# Patient Record
Sex: MALE | Race: WHITE | NOT HISPANIC OR LATINO | Employment: OTHER | URBAN - METROPOLITAN AREA
[De-identification: names, ages, dates, MRNs, and addresses within clinical notes are randomized per-mention and may not be internally consistent; named-entity substitution may affect disease eponyms.]

---

## 2019-06-28 ENCOUNTER — APPOINTMENT (OUTPATIENT)
Dept: RADIOLOGY | Facility: MEDICAL CENTER | Age: 59
End: 2019-06-28
Attending: EMERGENCY MEDICINE
Payer: COMMERCIAL

## 2019-06-28 ENCOUNTER — HOSPITAL ENCOUNTER (EMERGENCY)
Facility: MEDICAL CENTER | Age: 59
End: 2019-06-28
Attending: EMERGENCY MEDICINE
Payer: COMMERCIAL

## 2019-06-28 VITALS
TEMPERATURE: 98.4 F | WEIGHT: 242.29 LBS | RESPIRATION RATE: 16 BRPM | BODY MASS INDEX: 35.89 KG/M2 | HEIGHT: 69 IN | OXYGEN SATURATION: 95 % | SYSTOLIC BLOOD PRESSURE: 154 MMHG | HEART RATE: 66 BPM | DIASTOLIC BLOOD PRESSURE: 97 MMHG

## 2019-06-28 DIAGNOSIS — I82.4Z2 ACUTE DEEP VEIN THROMBOSIS (DVT) OF DISTAL VEIN OF LEFT LOWER EXTREMITY (HCC): ICD-10-CM

## 2019-06-28 LAB
ALBUMIN SERPL BCP-MCNC: 4 G/DL (ref 3.2–4.9)
ALBUMIN/GLOB SERPL: 1.3 G/DL
ALP SERPL-CCNC: 56 U/L (ref 30–99)
ALT SERPL-CCNC: 36 U/L (ref 2–50)
ANION GAP SERPL CALC-SCNC: 10 MMOL/L (ref 0–11.9)
APTT PPP: 26.9 SEC (ref 24.7–36)
AST SERPL-CCNC: 25 U/L (ref 12–45)
BASOPHILS # BLD AUTO: 0.2 % (ref 0–1.8)
BASOPHILS # BLD: 0.02 K/UL (ref 0–0.12)
BILIRUB SERPL-MCNC: 2.2 MG/DL (ref 0.1–1.5)
BUN SERPL-MCNC: 17 MG/DL (ref 8–22)
CALCIUM SERPL-MCNC: 8.9 MG/DL (ref 8.4–10.2)
CHLORIDE SERPL-SCNC: 98 MMOL/L (ref 96–112)
CO2 SERPL-SCNC: 29 MMOL/L (ref 20–33)
CREAT SERPL-MCNC: 0.99 MG/DL (ref 0.5–1.4)
EOSINOPHIL # BLD AUTO: 0.11 K/UL (ref 0–0.51)
EOSINOPHIL NFR BLD: 1.1 % (ref 0–6.9)
ERYTHROCYTE [DISTWIDTH] IN BLOOD BY AUTOMATED COUNT: 40 FL (ref 35.9–50)
GLOBULIN SER CALC-MCNC: 3.2 G/DL (ref 1.9–3.5)
GLUCOSE SERPL-MCNC: 103 MG/DL (ref 65–99)
HCT VFR BLD AUTO: 49.2 % (ref 42–52)
HGB BLD-MCNC: 16.8 G/DL (ref 14–18)
IMM GRANULOCYTES # BLD AUTO: 0.02 K/UL (ref 0–0.11)
IMM GRANULOCYTES NFR BLD AUTO: 0.2 % (ref 0–0.9)
INR PPP: 0.96 (ref 0.87–1.13)
LYMPHOCYTES # BLD AUTO: 2.37 K/UL (ref 1–4.8)
LYMPHOCYTES NFR BLD: 24.3 % (ref 22–41)
MCH RBC QN AUTO: 29.6 PG (ref 27–33)
MCHC RBC AUTO-ENTMCNC: 34.1 G/DL (ref 33.7–35.3)
MCV RBC AUTO: 86.8 FL (ref 81.4–97.8)
MONOCYTES # BLD AUTO: 0.48 K/UL (ref 0–0.85)
MONOCYTES NFR BLD AUTO: 4.9 % (ref 0–13.4)
NEUTROPHILS # BLD AUTO: 6.74 K/UL (ref 1.82–7.42)
NEUTROPHILS NFR BLD: 69.3 % (ref 44–72)
NRBC # BLD AUTO: 0 K/UL
NRBC BLD-RTO: 0 /100 WBC
PLATELET # BLD AUTO: 211 K/UL (ref 164–446)
PMV BLD AUTO: 10.2 FL (ref 9–12.9)
POTASSIUM SERPL-SCNC: 3.3 MMOL/L (ref 3.6–5.5)
PROT SERPL-MCNC: 7.2 G/DL (ref 6–8.2)
PROTHROMBIN TIME: 12.9 SEC (ref 12–14.6)
RBC # BLD AUTO: 5.67 M/UL (ref 4.7–6.1)
SODIUM SERPL-SCNC: 137 MMOL/L (ref 135–145)
WBC # BLD AUTO: 9.7 K/UL (ref 4.8–10.8)

## 2019-06-28 PROCEDURE — 93971 EXTREMITY STUDY: CPT | Mod: LT

## 2019-06-28 PROCEDURE — 85025 COMPLETE CBC W/AUTO DIFF WBC: CPT

## 2019-06-28 PROCEDURE — 700102 HCHG RX REV CODE 250 W/ 637 OVERRIDE(OP): Performed by: EMERGENCY MEDICINE

## 2019-06-28 PROCEDURE — A9270 NON-COVERED ITEM OR SERVICE: HCPCS | Performed by: EMERGENCY MEDICINE

## 2019-06-28 PROCEDURE — 99284 EMERGENCY DEPT VISIT MOD MDM: CPT

## 2019-06-28 PROCEDURE — 80053 COMPREHEN METABOLIC PANEL: CPT

## 2019-06-28 PROCEDURE — 85610 PROTHROMBIN TIME: CPT

## 2019-06-28 PROCEDURE — 85730 THROMBOPLASTIN TIME PARTIAL: CPT

## 2019-06-28 RX ADMIN — RIVAROXABAN 15 MG: 15 TABLET, FILM COATED ORAL at 17:50

## 2019-06-28 NOTE — ED PROVIDER NOTES
ED Provider Note    CHIEF COMPLAINT   Chief Complaint   Patient presents with   • Leg Swelling   • Leg Pain       HPI   Enmanuel Zhou is a 59 y.o. male who presents to the ED with left leg pain.  The patient states that he has been having left leg pain for the last 10 days.  He has a history of factor V Leiden, is on Plavix, he states that his last DVT was approximately 10 to 12 years ago.  He states that after he started complaining of leg pain he flew to [place], he states the pain got slightly improved but then got worse.  He notices a focal spot on the medial aspect of the left leg and then this morning starting noticing some breath, no history of cellulitis.  No abdominal pains, nausea vomiting.    REVIEW OF SYSTEMS   See HPI for further details. All other systems are negative.     PAST MEDICAL HISTORY   Past Medical History:   Diagnosis Date   • DVT (deep vein thrombosis) in pregnancy (HCC)    • Factor 5 Leiden mutation, heterozygous (HCC)        FAMILY HISTORY  History reviewed. No pertinent family history.    SOCIAL HISTORY  Social History     Social History   • Marital status: Single     Spouse name: N/A   • Number of children: N/A   • Years of education: N/A     Social History Main Topics   • Smoking status: Never Smoker   • Smokeless tobacco: Never Used   • Alcohol use Yes      Comment: glass of wine a day   • Drug use: No   • Sexual activity: Not on file     Other Topics Concern   • Not on file     Social History Narrative   • No narrative on file       SURGICAL HISTORY  Past Surgical History:   Procedure Laterality Date   • MADELIN BY LAPAROSCOPY  11/12/08    Performed by YASMANI KERN at SURGERY SAME DAY [facility] ORS   • OTHER ABDOMINAL SURGERY         CURRENT MEDICATIONS   Home Medications    **Home medications have not yet been reviewed for this encounter**         ALLERGIES   No Known Allergies    PHYSICAL EXAM  VITAL SIGNS: /97   Pulse 66   Temp 36.9 °C (98.4 °F) (Temporal)    "Resp 16   Ht 1.753 m (5' 9\")   Wt 109.9 kg (242 lb 4.6 oz)   SpO2 95%   BMI 35.78 kg/m²   Constitutional: Well developed, Well nourished, mild distress, Non-toxic appearance.   HENT:  Atraumatic, Normocephalic, Oral pharynx with moist mucous membranes.   Eyes: EOMI, PERRL  Cardiovascular: Good Pulses  Thorax & Lungs: No respiratory distress  Abdomen: Soft nontender   Skin: Warm, Dry, No erythema, No rash.   Musculoskeletal: Patient with soft tissue swelling and diffuse edema throughout the left lower leg, the patient does have some streaky erythema on the medial aspect of the left distal tib-fib area that was outlined with a pen.  Patient has 2+ pulses, slight tenderness palpation of the calf.  Neurologic: Alert & oriented x 3,     RADIOLOGY/PROCEDURES  US-EXTREMITY VENOUS LOWER UNILAT LEFT   Final Result        Results for orders placed or performed during the hospital encounter of 06/28/19   CBC WITH DIFFERENTIAL   Result Value Ref Range    WBC 9.7 4.8 - 10.8 K/uL    RBC 5.67 4.70 - 6.10 M/uL    Hemoglobin 16.8 14.0 - 18.0 g/dL    Hematocrit 49.2 42.0 - 52.0 %    MCV 86.8 81.4 - 97.8 fL    MCH 29.6 27.0 - 33.0 pg    MCHC 34.1 33.7 - 35.3 g/dL    RDW 40.0 35.9 - 50.0 fL    Platelet Count 211 164 - 446 K/uL    MPV 10.2 9.0 - 12.9 fL    Neutrophils-Polys 69.30 44.00 - 72.00 %    Lymphocytes 24.30 22.00 - 41.00 %    Monocytes 4.90 0.00 - 13.40 %    Eosinophils 1.10 0.00 - 6.90 %    Basophils 0.20 0.00 - 1.80 %    Immature Granulocytes 0.20 0.00 - 0.90 %    Nucleated RBC 0.00 /100 WBC    Neutrophils (Absolute) 6.74 1.82 - 7.42 K/uL    Lymphs (Absolute) 2.37 1.00 - 4.80 K/uL    Monos (Absolute) 0.48 0.00 - 0.85 K/uL    Eos (Absolute) 0.11 0.00 - 0.51 K/uL    Baso (Absolute) 0.02 0.00 - 0.12 K/uL    Immature Granulocytes (abs) 0.02 0.00 - 0.11 K/uL    NRBC (Absolute) 0.00 K/uL   COMP METABOLIC PANEL   Result Value Ref Range    Sodium 137 135 - 145 mmol/L    Potassium 3.3 (L) 3.6 - 5.5 mmol/L    Chloride 98 96 - 112 " mmol/L    Co2 29 20 - 33 mmol/L    Anion Gap 10.0 0.0 - 11.9    Glucose 103 (H) 65 - 99 mg/dL    Bun 17 8 - 22 mg/dL    Creatinine 0.99 0.50 - 1.40 mg/dL    Calcium 8.9 8.4 - 10.2 mg/dL    AST(SGOT) 25 12 - 45 U/L    ALT(SGPT) 36 2 - 50 U/L    Alkaline Phosphatase 56 30 - 99 U/L    Total Bilirubin 2.2 (H) 0.1 - 1.5 mg/dL    Albumin 4.0 3.2 - 4.9 g/dL    Total Protein 7.2 6.0 - 8.2 g/dL    Globulin 3.2 1.9 - 3.5 g/dL    A-G Ratio 1.3 g/dL   PROTHROMBIN TIME   Result Value Ref Range    PT 12.9 12.0 - 14.6 sec    INR 0.96 0.87 - 1.13   APTT   Result Value Ref Range    APTT 26.9 24.7 - 36.0 sec   ESTIMATED GFR   Result Value Ref Range    GFR If African American >60 >60 mL/min/1.73 m 2    GFR If Non African American >60 >60 mL/min/1.73 m 2        COURSE & MEDICAL DECISION MAKING  Pertinent Labs & Imaging studies reviewed. (See chart for details)  Patient with concerns about a DVT versus cellulitis, we will obtain ultrasound, this is negative we will put the patient on antibiotics.    Due to the patient's factor V Leiden, the gastrocnemius vein thrombosis, elected to treat the patient with Xarelto, will have the patient follow-up with the anticoagulation clinic, have the patient return with any other concerns.    FINAL IMPRESSION  1. Acute deep vein thrombosis (DVT) of distal vein of left lower extremity (HCC)        Patient referred to primary care provider for blood pressure management     This dictation was created using voice recognition software. The accuracy of the dictation is limited to the abilities of the software. I expect there may be some errors of grammar and possibly content. The nursing notes were reviewed and certain aspects of this information were incorporated into this note.    Electronically signed by: Asif Eason, 6/28/2019 3:06 PM

## 2019-06-28 NOTE — ED TRIAGE NOTES
Pt c/o LLE calf pain that started 2 weeks ago. Pt thought he pulled a muscle. Pt flew to [place] last week and flew back yesterday. Pt is on plavix, has factor 5 deficiency. Has had DVT's in the past. States the pain has gotten worse and the leg is swollen. Pt saw PCP and they tried to order an US but was unable to get it done. Denies any SOB or CP

## 2019-06-29 NOTE — ED NOTES
.Pt D/C to home. VSS. D/C instructions and 1 prescriptions and xarelto coupon pack given to patient. Pt to take the xarelto in addition to his plavix per erp orders. Pt to f/u with coagulation clinic on Monday morning for further evaluation. Pt verbalizes understanding. Pt leaves ED with no acute changes, complaints or concerns. Pt ambulated out with a steady gait and all belongings.

## 2020-03-13 ENCOUNTER — OFFICE VISIT (OUTPATIENT)
Dept: MEDICAL GROUP | Age: 60
End: 2020-03-13
Payer: COMMERCIAL

## 2020-03-13 VITALS
HEIGHT: 69 IN | HEART RATE: 80 BPM | TEMPERATURE: 97.7 F | WEIGHT: 247 LBS | OXYGEN SATURATION: 95 % | BODY MASS INDEX: 36.58 KG/M2 | DIASTOLIC BLOOD PRESSURE: 98 MMHG | SYSTOLIC BLOOD PRESSURE: 120 MMHG

## 2020-03-13 DIAGNOSIS — Z00.00 ANNUAL PHYSICAL EXAM: ICD-10-CM

## 2020-03-13 DIAGNOSIS — M25.512 ACUTE PAIN OF LEFT SHOULDER: ICD-10-CM

## 2020-03-13 DIAGNOSIS — I10 ESSENTIAL HYPERTENSION: ICD-10-CM

## 2020-03-13 DIAGNOSIS — Z00.00 ENCOUNTER FOR MEDICAL EXAMINATION TO ESTABLISH CARE: ICD-10-CM

## 2020-03-13 DIAGNOSIS — Z86.73 HISTORY OF CVA (CEREBROVASCULAR ACCIDENT): ICD-10-CM

## 2020-03-13 DIAGNOSIS — Z11.59 NEED FOR HEPATITIS C SCREENING TEST: ICD-10-CM

## 2020-03-13 DIAGNOSIS — D68.51 FACTOR 5 LEIDEN MUTATION, HETEROZYGOUS (HCC): ICD-10-CM

## 2020-03-13 DIAGNOSIS — E66.9 OBESITY (BMI 30-39.9): ICD-10-CM

## 2020-03-13 PROCEDURE — 99204 OFFICE O/P NEW MOD 45 MIN: CPT | Performed by: FAMILY MEDICINE

## 2020-03-13 ASSESSMENT — FIBROSIS 4 INDEX: FIB4 SCORE: 1.22

## 2020-03-13 NOTE — PROGRESS NOTES
This medical record contains text that has been entered with the assistance of computer voice recognition and dictation software. Therefore, it may contain unintended errors in text, spelling, punctuation or grammar.    Chief Complaint   Patient presents with   • Establish Care       HPI:   Enmanuel Zhou is a 60 y.o. male here evaluation and management of:      Encounter for medical examination to establish care  Annual physical exam    The patient presents today to establish care.     Today, the patient denied any acute complaints including recent illness, cough, chest pain, shortness of breath, abdominal pain, bowel changes, hematochezia, melena or urinary symptoms.      Past medical history--- Patient has a history of DVT and is heterozygous for factor 5 Leiden and is on Plavix. He had a stroke at age 40 in 2001.     Family History of Cancer--- Denies.    Family History of CAD--- Denies early history of cardiac disease.      Social History  Occupation---- Patient is employed as an  for [employer]. He plans to retire in August 2020.    Exercise--- Patient does not exercise regularly. His job is sedentary but states he will try to walk on his breaks on occasion.    Colonoscopy--- Completed 1-2 years ago and was normal.    Obesity (BMI 30-39.9)  Chronic history    The patient has a chronic history of obesity. He does not exercise regularly but will attempt to walk on his break on occasion. He does not follow a specific diet.    History of CVA (cerebrovascular accident)  Factor 5 Leiden mutation, heterozygous (HCC)  Chronic history    The patient experienced a cerebral vascular accident at age 40 in 2001. At that time, he was diagnosed with factor 5 Leiden and is on Plavix. Upon discontinuing Plavix and starting Aspirin, he experienced a DVT and phlebitis. He was restarted on Plavix. He is not taking Aspirin currently. Negative for acute neurological complaints or active  "bleeding.    Essential hypertension  Chronic history    Patient is taking Amlodipine dose? and denies any medication side effects. He reportedly monitors his blood pressure regularly at home. Blood pressure is 120/98 today. He reports following a low sodium diet and denies any related complaints including chronic cough, chest pain, headaches or dizziness.     Acute pain of left shoulder    The patient developed acute pain to his left shoulder following heavy lifting. Recently, he completed imaging of his shoulder but states this is pending. His pain remains constant and described as a dull ache. Pain is well controlled with over the counter medications. Negative for upper extremity weakness.     Need for hepatitis C screening test    The patient is due for hepatitis C screening. No prior history or known exposure.     Current medicines (including changes today)  No current outpatient medications on file.     No current facility-administered medications for this visit.      He  has a past medical history of DVT (deep vein thrombosis) in pregnancy and Factor 5 Leiden mutation, heterozygous (HCC).  He  has a past surgical history that includes cholo by laparoscopy (11/12/08) and other abdominal surgery.  Social History     Tobacco Use   • Smoking status: Never Smoker   • Smokeless tobacco: Never Used   Substance Use Topics   • Alcohol use: Yes     Comment: glass of wine a day   • Drug use: No     Social History     Social History Narrative   • Not on file     History reviewed. No pertinent family history.  No family status information on file.       ROS    Please see HPI for further details.     All other systems reviewed and are negative.     Objective:     /98 (BP Location: Left arm, Patient Position: Sitting, BP Cuff Size: Adult)   Pulse 80   Temp 36.5 °C (97.7 °F) (Temporal)   Ht 1.753 m (5' 9\")   Wt 112 kg (247 lb)   SpO2 95%  Body mass index is 36.48 kg/m².    Physical Exam:    Constitutional: Alert, " no distress.  Skin: Warm, dry, good turgor, no rashes in visible areas.  Eye: Equal, round and reactive, conjunctiva clear, lids normal.  ENMT: Lips without lesions, good dentition, oropharynx clear.  Neck: Trachea midline, no masses, no thyromegaly. No cervical or supraclavicular lymphadenopathy.  Respiratory: Unlabored respiratory effort, lungs clear to auscultation, no wheezes, no ronchi.  Cardiovascular: Normal S1, S2, no murmur, no edema.  Psych: Alert and oriented x3, normal affect and mood.      Assessment and Plan:   The following treatment plan was discussed:    1. Encounter for medical examination to establish care    Care has been established  We need baseline labs to establish a clinical profile      Age-appropriate preventive services recommended by USPTF guidelines and ACIP were discussed today.    The decision to initiate low-dose aspirin use for the primary prevention of CVD and CRC in adults aged 60 to 69 years who have a 10% or greater 10-year CVD risk should be an individual one    Requested any outside Medical records to be sent to us, he will bring us medication list as well  Denies intimate partner violence  Discussed seat belt safety    We discussed precautions for the coronavirus which include frequent hand washing with warm water and soap for at least 20 seconds, avoiding exposure to ill contacts and limiting exposure to heavily populated locations unless necessary.      This is a 60 year old male here today for a preventative exam.  Previous medical history, healthcare maintenance and immunization status reviewed.  Patient is up to date. Annual labwork ordered. See discussion of anticipatory guidance below. Patient will return annually for preventative exams.    - Comp Metabolic Panel; Future  - CBC WITHOUT DIFFERENTIAL; Future  - Lipid Profile; Future  - PROSTATE SPECIFIC AG DIAGNOSTIC; Future  - TSH+FREE T4  - T3 FREE; Future    2. Obesity (BMI 30-39.9)    Chronic history of obesity.  Recommended the patient exercise regularly and follow a healthy diet.    3. History of CVA (cerebrovascular accident)    Chronic in nature and stable. Patient continues taking Plavix. No acute symptoms.    4. Factor 5 Leiden mutation, heterozygous (HCC)    Chronic in nature and stable. Patient continues taking Plavix. No acute symptoms.    5. Essential hypertension    Chronic, uncontrolled  Amlodipine. No changes in dosage today. Blood pressure today was 120/98. Patient was asked to bring in a 2wk bp log with medication list    7. Acute pain of left shoulder    Acute complaint. Pending imaging results. Encouraged the patient to return to his provider for these results and treatment options.     8. Need for hepatitis C screening test    The patient was born between 1947 and 1967. He is due for hepatitis C screening. Order provided to the patient today. The USPSTF recommends offering 1-time screening for HCV infection to adults born between 1945 and 1965 (baby boomers).      HEALTH MAINTENANCE: As noted above.      -- Follow up in 6 months to one year. Instructed to follow up in clinic or ER for worsening symptoms, difficulty breathing, lack of expected recovery or should new symptoms or problems arise.      Once again this medical record contains text that has been entered with the assistance of computer voice recognition, dictation software and medical scribes. Therefore, it may contain unintended errors in text, spelling, punctuation or grammar.    Jerica DELGADO (Scribe), am scribing for, and in the presence of, Zander Manzanares M.D.    Electronically signed by: Jerica Morales (Scribe), 3/13/2020     IZander M.D. personally performed the services described in this documentation, as scribed by Jerica Morales in my presence, and it is both accurate and complete.

## 2020-04-07 ENCOUNTER — OFFICE VISIT (OUTPATIENT)
Dept: MEDICAL GROUP | Age: 60
End: 2020-04-07
Payer: COMMERCIAL

## 2020-04-07 VITALS
WEIGHT: 237 LBS | HEART RATE: 86 BPM | TEMPERATURE: 96.8 F | DIASTOLIC BLOOD PRESSURE: 79 MMHG | SYSTOLIC BLOOD PRESSURE: 125 MMHG | BODY MASS INDEX: 35.1 KG/M2 | HEIGHT: 69 IN

## 2020-04-07 DIAGNOSIS — E66.9 OBESITY (BMI 30-39.9): ICD-10-CM

## 2020-04-07 DIAGNOSIS — I10 ESSENTIAL HYPERTENSION: ICD-10-CM

## 2020-04-07 LAB
ALBUMIN SERPL-MCNC: 4.2 G/DL (ref 3.8–4.8)
ALBUMIN/GLOB SERPL: 1.6 {RATIO} (ref 1.2–2.2)
ALP SERPL-CCNC: 67 IU/L (ref 39–117)
ALT SERPL-CCNC: 25 IU/L (ref 0–44)
AST SERPL-CCNC: 21 IU/L (ref 0–40)
BILIRUB SERPL-MCNC: 1.8 MG/DL (ref 0–1.2)
BUN SERPL-MCNC: 23 MG/DL (ref 8–27)
BUN/CREAT SERPL: 19 (ref 10–24)
CALCIUM SERPL-MCNC: 9.2 MG/DL (ref 8.6–10.2)
CHLORIDE SERPL-SCNC: 103 MMOL/L (ref 96–106)
CHOLEST SERPL-MCNC: 169 MG/DL (ref 100–199)
CO2 SERPL-SCNC: 25 MMOL/L (ref 20–29)
CREAT SERPL-MCNC: 1.21 MG/DL (ref 0.76–1.27)
ERYTHROCYTE [DISTWIDTH] IN BLOOD BY AUTOMATED COUNT: 13.2 % (ref 11.6–15.4)
GLOBULIN SER CALC-MCNC: 2.7 G/DL (ref 1.5–4.5)
GLUCOSE SERPL-MCNC: 103 MG/DL (ref 65–99)
HCT VFR BLD AUTO: 50.4 % (ref 37.5–51)
HDLC SERPL-MCNC: 44 MG/DL
HGB BLD-MCNC: 17 G/DL (ref 13–17.7)
LABORATORY COMMENT REPORT: ABNORMAL
LDLC SERPL CALC-MCNC: 106 MG/DL (ref 0–99)
MCH RBC QN AUTO: 30.2 PG (ref 26.6–33)
MCHC RBC AUTO-ENTMCNC: 33.7 G/DL (ref 31.5–35.7)
MCV RBC AUTO: 90 FL (ref 79–97)
NRBC BLD AUTO-RTO: NORMAL %
PLATELET # BLD AUTO: 240 X10E3/UL (ref 150–450)
POTASSIUM SERPL-SCNC: 4.1 MMOL/L (ref 3.5–5.2)
PROT SERPL-MCNC: 6.9 G/DL (ref 6–8.5)
PSA SERPL-MCNC: 1 NG/ML (ref 0–4)
RBC # BLD AUTO: 5.63 X10E6/UL (ref 4.14–5.8)
SODIUM SERPL-SCNC: 143 MMOL/L (ref 134–144)
T3FREE SERPL-MCNC: 3.4 PG/ML (ref 2–4.4)
T4 FREE SERPL-MCNC: 1.2 NG/DL (ref 0.82–1.77)
TRIGL SERPL-MCNC: 93 MG/DL (ref 0–149)
TSH SERPL DL<=0.005 MIU/L-ACNC: 1.82 UIU/ML (ref 0.45–4.5)
VLDLC SERPL CALC-MCNC: 19 MG/DL (ref 5–40)
WBC # BLD AUTO: 6.9 X10E3/UL (ref 3.4–10.8)

## 2020-04-07 PROCEDURE — 99213 OFFICE O/P EST LOW 20 MIN: CPT | Mod: 95,CR | Performed by: FAMILY MEDICINE

## 2020-04-07 ASSESSMENT — PATIENT HEALTH QUESTIONNAIRE - PHQ9: CLINICAL INTERPRETATION OF PHQ2 SCORE: 0

## 2020-04-07 ASSESSMENT — FIBROSIS 4 INDEX: FIB4 SCORE: 1.085

## 2020-04-07 NOTE — PROGRESS NOTES
Telemedicine Visit: Established Patient     This encounter was conducted via [employer].   Verbal consent was obtained. Patient's identity was verified.    Subjective:   CC: htn, labs    Enmanuel Zhou is a 60 y.o. male presenting for evaluation and management of:    Hypertension, obesity, review labs      1. Obesity (BMI 30-39.9)    Patient states that he just started walking on his treadmill and plans to continue slowly increasing the mileage and intensity every week.  He has not had any chest pain shortness breath while walking on the treadmill.    2. Essential hypertension    Patient states he is taking combination of valsartan 160 mg daily  Amlodipine 10 mg p.o. daily  Hydrochlorothiazide 25 mg daily    He states his home BP has been 120 130/70-82    He denies any chest pain, no numbness or tingling no headaches no nausea no vomiting.        ROS   Denies any recent fevers, chills, nausea, vomiting, chest pain or shortness of breath.     No Known Allergies    Current medicines (including changes today)  No current outpatient medications on file.     No current facility-administered medications for this visit.        Patient Active Problem List    Diagnosis Date Noted   • Encounter for medical examination to establish care 03/13/2020   • Obesity (BMI 30-39.9) 03/13/2020   • History of CVA (cerebrovascular accident) 03/13/2020   • Factor 5 Leiden mutation, heterozygous (HCC) 03/13/2020   • Acute pain of left shoulder 03/13/2020       No family history on file.    He  has a past medical history of DVT (deep vein thrombosis) in pregnancy and Factor 5 Leiden mutation, heterozygous (HCC).  He  has a past surgical history that includes cholo by laparoscopy (11/12/08) and other abdominal surgery.       Objective:   Vitals obtained by patient:   4/7/20 3/13/20   Blood Pressure 125/79 120/98   Pulse 86 80   Temperature 36 °C (96.8 °F) 36.5 °C (97.7 °F)   Temp src Oral Temporal   Pulse Oximetry -- 95 %   Weight 107.5 kg  "(237 lb) 112 kg (247 lb)   Height 1.753 m (5' 9\") 1.753 m (5' 9\")   BMI (Calculated) 35          Physical Exam:  Constitutional: Alert, no distress, well-groomed.  Skin: No rashes in visible areas.  Eye: Round. Conjunctiva clear, lids normal. No icterus.   ENMT: Lips pink without lesions, good dentition, moist mucous membranes. Phonation normal.  Neck: No masses, no thyromegaly. Moves freely without pain.  CV: Pulse as reported by patient  Respiratory: Unlabored respiratory effort, no cough or audible wheeze  Psych: Alert and oriented x3, normal affect and mood.       Assessment and Plan:   The following treatment plan was discussed:     1. Obesity (BMI 30-39.9)  Encouraged the patient to continue lifestyle modification continue increasing miles on the treadmill.  We also discussed calorie restriction.    2. Essential hypertension  Patient has been stable with current management  We will make no changes for now    Follow-up: No follow-ups on file. Patient is encouraged to be seen in the emergency room for chest pain, palpitations, shortness of breath, dizziness, severe abdominal pain or other concerning symptoms.    We discussed precautions for the coronavirus which include frequent hand washing with warm water and soap for at least 20 seconds, avoiding exposure to ill contacts and limiting exposure to heavily populated locations unless necessary.    Once again this medical record contains text that has been entered with the assistance of computer voice recognition, dictation software and medical scribes. Therefore, it may contain unintended errors in text, spelling, punctuation or grammar.           "

## 2021-03-15 DIAGNOSIS — Z23 NEED FOR VACCINATION: ICD-10-CM

## 2021-03-16 ENCOUNTER — OFFICE VISIT (OUTPATIENT)
Dept: MEDICAL GROUP | Age: 61
End: 2021-03-16
Payer: COMMERCIAL

## 2021-03-16 VITALS
TEMPERATURE: 97.8 F | OXYGEN SATURATION: 97 % | DIASTOLIC BLOOD PRESSURE: 86 MMHG | SYSTOLIC BLOOD PRESSURE: 130 MMHG | BODY MASS INDEX: 29.83 KG/M2 | HEIGHT: 69 IN | RESPIRATION RATE: 16 BRPM | WEIGHT: 201.4 LBS | HEART RATE: 60 BPM

## 2021-03-16 DIAGNOSIS — Z86.73 HISTORY OF CVA (CEREBROVASCULAR ACCIDENT): ICD-10-CM

## 2021-03-16 DIAGNOSIS — Z11.59 NEED FOR HEPATITIS C SCREENING TEST: ICD-10-CM

## 2021-03-16 DIAGNOSIS — I10 ESSENTIAL HYPERTENSION: ICD-10-CM

## 2021-03-16 DIAGNOSIS — Z12.11 SCREENING FOR COLORECTAL CANCER: ICD-10-CM

## 2021-03-16 DIAGNOSIS — Z12.12 SCREENING FOR COLORECTAL CANCER: ICD-10-CM

## 2021-03-16 DIAGNOSIS — D68.51 FACTOR 5 LEIDEN MUTATION, HETEROZYGOUS (HCC): ICD-10-CM

## 2021-03-16 DIAGNOSIS — Z00.00 ANNUAL PHYSICAL EXAM: ICD-10-CM

## 2021-03-16 PROCEDURE — 99396 PREV VISIT EST AGE 40-64: CPT | Performed by: FAMILY MEDICINE

## 2021-03-16 RX ORDER — AMLODIPINE BESYLATE 5 MG/1
5 TABLET ORAL DAILY
Qty: 90 TABLET | Refills: 0 | Status: SHIPPED | OUTPATIENT
Start: 2021-03-16 | End: 2021-06-10 | Stop reason: SDUPTHER

## 2021-03-16 RX ORDER — LOSARTAN POTASSIUM AND HYDROCHLOROTHIAZIDE 25; 100 MG/1; MG/1
1 TABLET ORAL DAILY
Qty: 90 TABLET | Refills: 2 | Status: SHIPPED | OUTPATIENT
Start: 2021-03-16 | End: 2021-06-10 | Stop reason: SDUPTHER

## 2021-03-16 RX ORDER — CLOPIDOGREL BISULFATE 75 MG/1
75 TABLET ORAL
COMMUNITY
Start: 2021-03-11 | End: 2021-06-10 | Stop reason: SDUPTHER

## 2021-03-16 ASSESSMENT — FIBROSIS 4 INDEX: FIB4 SCORE: 1.1

## 2021-03-16 ASSESSMENT — PATIENT HEALTH QUESTIONNAIRE - PHQ9: CLINICAL INTERPRETATION OF PHQ2 SCORE: 0

## 2021-03-16 NOTE — PROGRESS NOTES
Subjective:     CC:   Chief Complaint   Patient presents with   • Annual Exam       HPI:   Enmanuel Zhou is a 61 y.o. male who presents for annual exam    Last Colorectal Cancer Screening: overdue  Last Tdap: overdue, refused  Received HPV series: Aged out  Hx STDs: No    Exercise: no regular exercise, sedentary  Diet: regular      He  has a past medical history of DVT (deep vein thrombosis) in pregnancy and Factor 5 Leiden mutation, heterozygous (HCC).  He  has a past surgical history that includes cholo by laparoscopy (11/12/08) and other abdominal surgery.    History reviewed. No pertinent family history.  Social History     Tobacco Use   • Smoking status: Never Smoker   • Smokeless tobacco: Never Used   Substance Use Topics   • Alcohol use: Yes     Comment: glass of wine a day   • Drug use: No     He  has no history on file for sexual activity.    Patient Active Problem List    Diagnosis Date Noted   • Essential hypertension 04/07/2020   • Encounter for medical examination to establish care 03/13/2020   • Obesity (BMI 30-39.9) 03/13/2020   • History of CVA (cerebrovascular accident) 03/13/2020   • Factor 5 Leiden mutation, heterozygous (HCC) 03/13/2020   • Acute pain of left shoulder 03/13/2020     Current Outpatient Medications   Medication Sig Dispense Refill   • losartan-hydrochlorothiazide (HYZAAR) 100-25 MG per tablet Take 1 tablet by mouth every day. 90 tablet 2   • amLODIPine (NORVASC) 5 MG Tab Take 1 tablet by mouth every day. 90 tablet 0   • clopidogrel (PLAVIX) 75 MG Tab Take 75 mg by mouth.     • Clopidogrel Bisulfate (PLAVIX PO) Take  by mouth.       No current facility-administered medications for this visit.     No Known Allergies    Review of Systems   Constitutional: Negative for fever, chills and malaise/fatigue.   HENT: Negative for congestion.    Eyes: Negative for pain.   Respiratory: Negative for cough and shortness of breath.    Cardiovascular: Negative for chest pain and leg swelling.  "  Gastrointestinal: Negative for nausea, vomiting, abdominal pain and diarrhea.   Genitourinary: Negative for dysuria and hematuria.   Skin: Negative for rash.   Neurological: Negative for dizziness, focal weakness and headaches.   Endo/Heme/Allergies: Does not bruise/bleed easily.   Psychiatric/Behavioral: Negative for depression.  The patient is not nervous/anxious.      Objective:   /86 (BP Location: Right arm, Patient Position: Sitting, BP Cuff Size: Adult)   Pulse 60   Temp 36.6 °C (97.8 °F) (Temporal)   Resp 16   Ht 1.753 m (5' 9\")   Wt 91.4 kg (201 lb 6.4 oz)   SpO2 97%   BMI 29.74 kg/m²      Wt Readings from Last 4 Encounters:   03/16/21 91.4 kg (201 lb 6.4 oz)   04/07/20 108 kg (237 lb)   03/13/20 112 kg (247 lb)   06/28/19 110 kg (242 lb 4.6 oz)           Physical Exam:  Constitutional: Well-developed and well-nourished. Not diaphoretic. No distress.   Skin: Skin is warm and dry. No rash noted.  Head: Atraumatic without lesions.  Eyes: Conjunctivae and extraocular motions are normal. Pupils are equal, round, and reactive to light. No scleral icterus.   Ears:  External ears unremarkable. Tympanic membranes clear and intact.  Nose: Nares patent. Septum midline. Turbinates without erythema nor edema. No discharge.   Mouth/Throat: Tongue normal. Oropharynx is clear and moist. Posterior pharynx without erythema or exudates.  Neck: Supple, trachea midline. Normal range of motion. No thyromegaly present. No lymphadenopathy--cervical or supraclavicular.  Cardiovascular: Regular rate and rhythm, S1 and S2 without murmur, rubs, or gallops.    Respiratory: Effort normal. Clear to auscultation throughout. No adventitious sounds.   Abdomen: Soft, non tender, and without distention. Active bowel sounds in all four quadrants. No rebound, guarding, masses or HSM.    Extremities: No cyanosis, clubbing, erythema, nor edema. Distal pulses intact and symmetric.   Musculoskeletal: All major joints AROM full in all " directions without pain.  Neurological: Alert and oriented x 3. Grossly non-focal. Strength and sensation grossly intact. DTRs 2+/3 and symmetric.   Psychiatric:  Behavior, mood, and affect are appropriate.      Assessment and Plan:     1. Annual physical exam  - Comp Metabolic Panel; Future  - CBC WITHOUT DIFFERENTIAL; Future  - Lipid Profile; Future  - PROSTATE SPECIFIC AG DIAGNOSTIC; Future  - TSH+FREE T4  - T3 FREE; Future  - VITAMIN D,25 HYDROXY; Future    2. Essential hypertension    Patient states that the triple medication combination of his antihypertension dedication was too expensive.  He is interested in  them.     He is to start the new medications and send me a 2-week BP log    - losartan-hydrochlorothiazide (HYZAAR) 100-25 MG per tablet; Take 1 tablet by mouth every day.  Dispense: 90 tablet; Refill: 2  - amLODIPine (NORVASC) 5 MG Tab; Take 1 tablet by mouth every day.  Dispense: 90 tablet; Refill: 0    3. History of CVA (cerebrovascular accident)  4. Factor 5 Leiden mutation, heterozygous (HCC)  Patient has been stable with current management  We will make no changes for now    5. Need for hepatitis C screening test    - HEP C VIRUS ANTIBODY; Future    6. Screening for colorectal cancer    - REFERRAL TO GI FOR COLONOSCOPY    Other orders  - clopidogrel (PLAVIX) 75 MG Tab; Take 75 mg by mouth.      Health maintenance:     Labs per orders  Immunizations per orders  Patient counseled about skin care, diet, supplements, and exercise.  Discussed colorectal cancer screening     Follow-up: Return in about 2 weeks (around 3/30/2021) for 2 week BP log.

## 2021-04-14 LAB
25(OH)D3+25(OH)D2 SERPL-MCNC: 34.2 NG/ML (ref 30–100)
ALBUMIN SERPL-MCNC: 4.1 G/DL (ref 3.8–4.8)
ALBUMIN/GLOB SERPL: 1.8 {RATIO} (ref 1.2–2.2)
ALP SERPL-CCNC: 75 IU/L (ref 39–117)
ALT SERPL-CCNC: 14 IU/L (ref 0–44)
AST SERPL-CCNC: 13 IU/L (ref 0–40)
BASOPHILS # BLD AUTO: 0 X10E3/UL (ref 0–0.2)
BASOPHILS NFR BLD AUTO: 1 %
BILIRUB SERPL-MCNC: 1.1 MG/DL (ref 0–1.2)
BUN SERPL-MCNC: 15 MG/DL (ref 8–27)
BUN/CREAT SERPL: 16 (ref 10–24)
CALCIUM SERPL-MCNC: 9.2 MG/DL (ref 8.6–10.2)
CHLORIDE SERPL-SCNC: 105 MMOL/L (ref 96–106)
CHOLEST SERPL-MCNC: 153 MG/DL (ref 100–199)
CO2 SERPL-SCNC: 23 MMOL/L (ref 20–29)
CREAT SERPL-MCNC: 0.92 MG/DL (ref 0.76–1.27)
EOSINOPHIL # BLD AUTO: 0.1 X10E3/UL (ref 0–0.4)
EOSINOPHIL NFR BLD AUTO: 2 %
ERYTHROCYTE [DISTWIDTH] IN BLOOD BY AUTOMATED COUNT: 12.6 % (ref 11.6–15.4)
GLOBULIN SER CALC-MCNC: 2.3 G/DL (ref 1.5–4.5)
GLUCOSE SERPL-MCNC: 99 MG/DL (ref 65–99)
HCT VFR BLD AUTO: 48.2 % (ref 37.5–51)
HDLC SERPL-MCNC: 50 MG/DL
HGB BLD-MCNC: 16.8 G/DL (ref 13–17.7)
IMM GRANULOCYTES # BLD AUTO: 0 X10E3/UL (ref 0–0.1)
IMM GRANULOCYTES NFR BLD AUTO: 0 %
IMMATURE CELLS  115398: NORMAL
LABORATORY COMMENT REPORT: NORMAL
LDLC SERPL CALC-MCNC: 91 MG/DL (ref 0–99)
LYMPHOCYTES # BLD AUTO: 1.6 X10E3/UL (ref 0.7–3.1)
LYMPHOCYTES NFR BLD AUTO: 28 %
MCH RBC QN AUTO: 31.6 PG (ref 26.6–33)
MCHC RBC AUTO-ENTMCNC: 34.9 G/DL (ref 31.5–35.7)
MCV RBC AUTO: 91 FL (ref 79–97)
MONOCYTES # BLD AUTO: 0.4 X10E3/UL (ref 0.1–0.9)
MONOCYTES NFR BLD AUTO: 7 %
MORPHOLOGY BLD-IMP: NORMAL
NEUTROPHILS # BLD AUTO: 3.5 X10E3/UL (ref 1.4–7)
NEUTROPHILS NFR BLD AUTO: 62 %
NRBC BLD AUTO-RTO: NORMAL %
PLATELET # BLD AUTO: 222 X10E3/UL (ref 150–450)
POTASSIUM SERPL-SCNC: 3.6 MMOL/L (ref 3.5–5.2)
PROT SERPL-MCNC: 6.4 G/DL (ref 6–8.5)
PSA SERPL-MCNC: 0.9 NG/ML (ref 0–4)
RBC # BLD AUTO: 5.32 X10E6/UL (ref 4.14–5.8)
SODIUM SERPL-SCNC: 143 MMOL/L (ref 134–144)
T3FREE SERPL-MCNC: 3.4 PG/ML (ref 2–4.4)
T4 FREE SERPL-MCNC: 1.2 NG/DL (ref 0.82–1.77)
TRIGL SERPL-MCNC: 61 MG/DL (ref 0–149)
TSH SERPL DL<=0.005 MIU/L-ACNC: 0.96 UIU/ML (ref 0.45–4.5)
VLDLC SERPL CALC-MCNC: 12 MG/DL (ref 5–40)
WBC # BLD AUTO: 5.6 X10E3/UL (ref 3.4–10.8)

## 2021-06-10 DIAGNOSIS — I10 ESSENTIAL HYPERTENSION: ICD-10-CM

## 2021-06-10 NOTE — TELEPHONE ENCOUNTER
Received request via: Patient    Was the patient seen in the last year in this department? Yes    Does the patient have an active prescription (recently filled or refills available) for medication(s) requested? Pt requesting 90 days with 3 refills on all 3 rx's.

## 2021-06-11 RX ORDER — LOSARTAN POTASSIUM AND HYDROCHLOROTHIAZIDE 25; 100 MG/1; MG/1
1 TABLET ORAL DAILY
Qty: 90 TABLET | Refills: 3 | Status: SHIPPED | OUTPATIENT
Start: 2021-06-11 | End: 2021-12-29

## 2021-06-11 RX ORDER — AMLODIPINE BESYLATE 5 MG/1
5 TABLET ORAL DAILY
Qty: 90 TABLET | Refills: 3 | Status: SHIPPED | OUTPATIENT
Start: 2021-06-11 | End: 2022-07-01 | Stop reason: SDUPTHER

## 2021-06-11 RX ORDER — CLOPIDOGREL BISULFATE 75 MG/1
75 TABLET ORAL DAILY
Qty: 90 TABLET | Refills: 3 | Status: SHIPPED | OUTPATIENT
Start: 2021-06-11 | End: 2021-11-10

## 2021-09-07 ENCOUNTER — OFFICE VISIT (OUTPATIENT)
Dept: MEDICAL GROUP | Age: 61
End: 2021-09-07
Payer: COMMERCIAL

## 2021-09-07 ENCOUNTER — HOSPITAL ENCOUNTER (OUTPATIENT)
Dept: RADIOLOGY | Facility: MEDICAL CENTER | Age: 61
End: 2021-09-07
Attending: FAMILY MEDICINE
Payer: COMMERCIAL

## 2021-09-07 VITALS
DIASTOLIC BLOOD PRESSURE: 72 MMHG | WEIGHT: 198.8 LBS | HEIGHT: 69 IN | TEMPERATURE: 97.3 F | OXYGEN SATURATION: 97 % | SYSTOLIC BLOOD PRESSURE: 114 MMHG | HEART RATE: 69 BPM | BODY MASS INDEX: 29.44 KG/M2

## 2021-09-07 DIAGNOSIS — M25.561 ACUTE PAIN OF RIGHT KNEE: ICD-10-CM

## 2021-09-07 PROCEDURE — 73565 X-RAY EXAM OF KNEES: CPT

## 2021-09-07 PROCEDURE — 99213 OFFICE O/P EST LOW 20 MIN: CPT | Performed by: FAMILY MEDICINE

## 2021-09-07 ASSESSMENT — FIBROSIS 4 INDEX: FIB4 SCORE: 0.99

## 2021-09-07 NOTE — PROGRESS NOTES
This medical record contains text that has been entered with the assistance of computer voice recognition and dictation software.  Therefore, it may contain unintended errors in text, spelling, punctuation, or grammar      Chief Complaint   Patient presents with   • Knee Pain     Mostly R knee; however both legs from the knee down ache.   • Paperwork     DMV Placard    • Requesting Labs     Osteoarthritis          Enmanuel Zhou is a 61 y.o. male here evaluation and management of: Right knee pain      HPI:     1. Acute pain of right knee  NEW UNDIAGNOSED PROBLEM    The patient is a very pleasant 61-year-old male who presents to clinic with a chief complaint of right knee pain.  He states this started over a year ago however it comes and goes this recent episode of pain started about a week ago.  The pain is above the patella and below the patella worse with bending or running on a treadmill.  He does feel some popping at times denies any locking or instability there was some trauma.  His 80 pound dog ran into the side of his right knee about a year ago.  He denies any fevers chills night sweats no new rashes.  The patient is concerned because his sister who is younger than him had total knee replacement.  However she is much heavier than him.    Current medicines (including changes today)  Current Outpatient Medications   Medication Sig Dispense Refill   • clopidogrel (PLAVIX) 75 MG Tab Take 1 tablet by mouth every day. 90 tablet 3   • losartan-hydrochlorothiazide (HYZAAR) 100-25 MG per tablet Take 1 tablet by mouth every day. 90 tablet 3   • amLODIPine (NORVASC) 5 MG Tab Take 1 tablet by mouth every day. 90 tablet 3     No current facility-administered medications for this visit.     He  has a past medical history of DVT (deep vein thrombosis) in pregnancy and Factor 5 Leiden mutation, heterozygous (HCC).  He  has a past surgical history that includes cholo by laparoscopy (11/12/08) and other abdominal  "surgery.  Social History     Tobacco Use   • Smoking status: Never Smoker   • Smokeless tobacco: Never Used   Vaping Use   • Vaping Use: Never used   Substance Use Topics   • Alcohol use: Yes     Alcohol/week: 4.2 oz     Types: 7 Glasses of wine per week     Comment: glass of wine a day   • Drug use: No     Social History     Social History Narrative   • Not on file     History reviewed. No pertinent family history.  No family status information on file.         ROS    The pertinent  ROS findings can be seen in the HPI above.     All other systems reviewed and are negative     Objective:     /72 (BP Location: Left arm, Patient Position: Sitting, BP Cuff Size: Adult)   Pulse 69   Temp 36.3 °C (97.3 °F) (Temporal)   Ht 1.753 m (5' 9\")   Wt 90.2 kg (198 lb 12.8 oz)   SpO2 97%  Body mass index is 29.36 kg/m².      Physical Exam:    Constitutional: Alert, no distress.  Skin: No suspicious lesions  Eye: Equal, round and reactive, conjunctiva clear, lids normal.  ENMT: Lips without lesions, good dentition, oropharynx clear.  Neck: Trachea midline, no masses, no thyromegaly. No cervical or supraclavicular lymphadenopathy.  Respiratory: Unlabored respiratory effort, lungs clear to auscultation, no wheezes, no ronchi.  Cardiovascular: Normal S1, S2, no murmur, no edema  Abdomen: Soft, non-tender, no masses, no hepatosplenomegaly.  Right Knee     negative anterior drawer, negative posterior drawer, negative lachmann,  no joint line tenderness, negative Thessalay test, normal gait, no asymetry of quadriceps,   negative valgus and varus stress,  negative Roderick, Negative Pivot,       Assessment and Plan:   The following treatment plan was discussed      1. Acute pain of right knee    The patient states that he would like to have hyaluronic injections into his knee.  We will obtain a plain film and refer to physical therapy and orthopedics  I discussed the importance of strengthening the periarticular joint " specifically the hamstring quadriceps and calves.    - DX-KNEES-AP BILATERAL STANDING; Future  - REFERRAL TO PHYSICAL THERAPY  - REFERRAL TO ORTHOPEDICS            Instructed to Follow up in clinic or ER for worsening symptoms, difficulty breathing, lack of expected recovery, or should new symptoms or problems arise.    Followup: Return in about 6 months (around 3/7/2022) for Reevaluation, labs.

## 2021-10-26 ENCOUNTER — APPOINTMENT (OUTPATIENT)
Dept: MEDICAL GROUP | Age: 61
End: 2021-10-26
Payer: COMMERCIAL

## 2021-11-09 ENCOUNTER — OFFICE VISIT (OUTPATIENT)
Dept: MEDICAL GROUP | Age: 61
End: 2021-11-09
Payer: COMMERCIAL

## 2021-11-09 ENCOUNTER — HOSPITAL ENCOUNTER (EMERGENCY)
Facility: MEDICAL CENTER | Age: 61
End: 2021-11-09
Attending: EMERGENCY MEDICINE
Payer: COMMERCIAL

## 2021-11-09 ENCOUNTER — HOSPITAL ENCOUNTER (OUTPATIENT)
Dept: RADIOLOGY | Facility: MEDICAL CENTER | Age: 61
End: 2021-11-09
Attending: FAMILY MEDICINE
Payer: COMMERCIAL

## 2021-11-09 VITALS
HEART RATE: 70 BPM | RESPIRATION RATE: 18 BRPM | SYSTOLIC BLOOD PRESSURE: 136 MMHG | OXYGEN SATURATION: 98 % | BODY MASS INDEX: 29.17 KG/M2 | WEIGHT: 197.53 LBS | DIASTOLIC BLOOD PRESSURE: 82 MMHG | TEMPERATURE: 97.4 F

## 2021-11-09 VITALS
OXYGEN SATURATION: 94 % | TEMPERATURE: 97.7 F | BODY MASS INDEX: 29.47 KG/M2 | HEIGHT: 69 IN | SYSTOLIC BLOOD PRESSURE: 110 MMHG | DIASTOLIC BLOOD PRESSURE: 78 MMHG | RESPIRATION RATE: 16 BRPM | WEIGHT: 199 LBS | HEART RATE: 77 BPM

## 2021-11-09 DIAGNOSIS — Z23 NEED FOR VACCINATION: ICD-10-CM

## 2021-11-09 DIAGNOSIS — M79.89 RIGHT LEG SWELLING: ICD-10-CM

## 2021-11-09 DIAGNOSIS — I82.4Z1 ACUTE DEEP VEIN THROMBOSIS (DVT) OF DISTAL VEIN OF RIGHT LOWER EXTREMITY (HCC): ICD-10-CM

## 2021-11-09 PROCEDURE — 90471 IMMUNIZATION ADMIN: CPT | Performed by: FAMILY MEDICINE

## 2021-11-09 PROCEDURE — 90686 IIV4 VACC NO PRSV 0.5 ML IM: CPT | Performed by: FAMILY MEDICINE

## 2021-11-09 PROCEDURE — 99214 OFFICE O/P EST MOD 30 MIN: CPT | Performed by: FAMILY MEDICINE

## 2021-11-09 PROCEDURE — A9270 NON-COVERED ITEM OR SERVICE: HCPCS | Performed by: EMERGENCY MEDICINE

## 2021-11-09 PROCEDURE — 700102 HCHG RX REV CODE 250 W/ 637 OVERRIDE(OP): Performed by: EMERGENCY MEDICINE

## 2021-11-09 PROCEDURE — 93971 EXTREMITY STUDY: CPT | Mod: RT

## 2021-11-09 PROCEDURE — 99283 EMERGENCY DEPT VISIT LOW MDM: CPT

## 2021-11-09 RX ADMIN — RIVAROXABAN 15 MG: 15 TABLET, FILM COATED ORAL at 14:45

## 2021-11-09 ASSESSMENT — FIBROSIS 4 INDEX
FIB4 SCORE: 0.99
FIB4 SCORE: 0.99

## 2021-11-09 NOTE — ED NOTES
Pt and family member at bedside verbalize understanding of discharge instructions and follow up/prescription . Pt able to ambulate out to ED lobby with all belongings.  Pt given good Rx card. SW contacted for financial prescription help.

## 2021-11-09 NOTE — ED TRIAGE NOTES
Patient has a history of dvt, on plavix, stopped taking it for five days for a colonoscopy.  Noted right leg swelling shortly after, had us, sent here for medication management, back on plavix.

## 2021-11-09 NOTE — DISCHARGE INSTRUCTIONS
Compression stockings.  Start Xarelto, stop Plavix.  As we talked about, ok to walk, etc but nothing too strenuous.  I have put in a referral to the [facility].  They should be contacting you--if not, go through Dr Manzanares.  Talk with either of them about the duration of your treatment.  Return to ER for any turn for the worse.

## 2021-11-09 NOTE — ED PROVIDER NOTES
ED Provider Note    CHIEF COMPLAINT  Chief Complaint   Patient presents with   • Other     dvt        HPI  Enmanuel Zhou is a 61 y.o. male who presents with a DVT. He was sent over by his doctor for evaluation. He had a colonoscopy beginning of October, stopped his Plavix 5 days for this. The day after the procedure, he started having some pain in his right leg. It ended up going away. However, he is noted the leg is swollen, and uncomfortable. He has had a thrombosis before, is concerned this is what he has again. He is also had a stroke. He had been maintained on Eliquis but it was too expensive, so they transitioned him instead to antiplatelet therapy. He has been on Coumadin in the past but had a very difficult time getting a therapeutic level. He denies any headache, chest pain, shortness of breath, palpitations. Has been up and active such as doing yard work, has had no exertional symptoms at all. There is no other complaint.    PAST MEDICAL HISTORY  Past Medical History:   Diagnosis Date   • DVT (deep vein thrombosis)     • Factor 5 Leiden mutation, heterozygous (HCC)        FAMILY HISTORY  No family history on file.    SOCIAL HISTORY  Social History     Tobacco Use   • Smoking status: Never Smoker   • Smokeless tobacco: Never Used   Vaping Use   • Vaping Use: Never used   Substance Use Topics   • Alcohol use: Yes     Alcohol/week: 4.2 oz     Types: 7 Glasses of wine per week     Comment: glass of wine a day   • Drug use: No         SURGICAL HISTORY  Past Surgical History:   Procedure Laterality Date   • MADELIN BY LAPAROSCOPY  11/12/08    Performed by YASMANI KERN at SURGERY SAME DAY [facility]S   • OTHER ABDOMINAL SURGERY         CURRENT MEDICATIONS  Home Medications     Reviewed by Jazmin Tamayo R.N. (Registered Nurse) on 11/09/21 at 1148  Med List Status: <None>   Medication Last Dose Status   amLODIPine (NORVASC) 5 MG Tab  Active   clopidogrel (PLAVIX) 75 MG Tab  Active    losartan-hydrochlorothiazide (HYZAAR) 100-25 MG per tablet  Active                I have reviewed the nurses notes and/or the list brought with the patient.    ALLERGIES  No Known Allergies    REVIEW OF SYSTEMS  See HPI for further details. Review of systems as above, otherwise all other systems are negative.     PHYSICAL EXAM  VITAL SIGNS: /81   Pulse 68   Temp 36.4 °C (97.5 °F) (Temporal)   Resp 16   Wt 89.6 kg (197 lb 8.5 oz)   SpO2 97%   BMI 29.17 kg/m²     Constitutional: Well appearing patient in no acute distress.  Not toxic, nor ill in appearance.  HENT: Mucus membranes moist.  Oropharynx is clear.  Eyes: Pupils equally round.  No scleral icterus.   Neck: Full nontender range of motion.  Lymphatic: No cervical lymphadenopathy noted.   Cardiovascular: Regular heart rate and rhythm.  No murmurs, rubs, nor gallop appreciated.   Thorax & Lungs: Chest is nontender.  Lungs are clear to auscultation with good air movement bilaterally.  No wheeze, rhonchi, nor rales.   Abdomen: Soft, with no tenderness, rebound nor guarding.  No mass, pulsatile mass, nor hepatosplenomegaly appreciated.  Skin: No purpura nor petechia noted.  Extremities/Musculoskeletal: No sign of trauma. Minimal right calf tenderness. There is some mild edema here compared to the left. No obvious Homans' sign. Pulses are intact throughout.  Neurologic: Alert & oriented.  Strength and sensation is intact all around.  Gait is normal.  Psychiatric: Normal affect appropriate for the clinical situation.    LABS  CBC, CMP in April are normal.    RADIOLOGY/PROCEDURESRight lower extremity.    Echolucent material fills the femoral, popliteal and proximal posterior    tibial and peroneal veins consistent with acute, occlusive deep venous    thrombosis. There is also acute nonocclusive DVT within the common femoral,    posterior tibial, peroneal and soleal veins.      MEDICAL RECORD  I have reviewed patient's medical record and pertinent results  are listed above.    COURSE & MEDICAL DECISION MAKING  I have reviewed any medical record information, laboratory studies and radiographic results as noted above.  This is a patient who is sent over from his doctor's office with a request for me to start Xarelto. I guess there was a concern about possible pulmonary embolism. However, he has no symptoms to suggest this. His vital signs are normal. At this point we will go ahead and put him on Xarelto. He was given my usual discussion about DVT and pulmonary embolism. As well as my usual discussion about anticoagulation/treatment of DVT. Specifically we talked about anticoagulation, the risks and benefits. We talked about wearing compression hose/stockings. We talked about the risks of bleeding and indications for being seen. For now, I advised that he can walk and get around the house but he should generally take it easy for now. I put a referral to the anticoagulation clinic. He was advised if he has not heard from them, he may follow-up with Dr. Ureña. Instructions on DVT.      FINAL IMPRESSION  1. Acute deep vein thrombosis (DVT) of distal vein of right lower extremity (HCC)  Referral to Anticoagulation Monitoring    rivaroxaban (XARELTO) 15 MG Tab tablet          This dictation was created using voice recognition software.    Electronically signed by: Dada Weber M.D., 11/9/2021 2:27 PM

## 2021-11-09 NOTE — PROGRESS NOTES
This medical record contains text that has been entered with the assistance of computer voice recognition and dictation software.  Therefore, it may contain unintended errors in text, spelling, punctuation, or grammar      Chief Complaint   Patient presents with   • Leg Swelling     Rt leg         Enmanuel Zhou is a 61 y.o. male here evaluation and management of: Right leg swelling      HPI:     1. Right leg swelling  NEW UNDIAGNOSED PROBLEM    The patient is a very pleasant 61-year-old male who presents to clinic with chief complaint of right lower leg swelling.  He states it started maybe 2 weeks ago.  He denies any chest pain no shortness of breath no long recent periods of immobilization.  He denies any fever chills night sweats no trauma to the region.  He does have a history of DVT and factor V Leiden mutation heterozygous.      2. Need for vaccination  Due for flu vaccine    Current medicines (including changes today)  Current Outpatient Medications   Medication Sig Dispense Refill   • clopidogrel (PLAVIX) 75 MG Tab Take 1 tablet by mouth every day. 90 tablet 3   • losartan-hydrochlorothiazide (HYZAAR) 100-25 MG per tablet Take 1 tablet by mouth every day. 90 tablet 3   • amLODIPine (NORVASC) 5 MG Tab Take 1 tablet by mouth every day. 90 tablet 3     No current facility-administered medications for this visit.     He  has a past medical history of DVT (deep vein thrombosis) in pregnancy and Factor 5 Leiden mutation, heterozygous (HCC).  He  has a past surgical history that includes cholo by laparoscopy (11/12/08) and other abdominal surgery.  Social History     Tobacco Use   • Smoking status: Never Smoker   • Smokeless tobacco: Never Used   Vaping Use   • Vaping Use: Never used   Substance Use Topics   • Alcohol use: Yes     Alcohol/week: 4.2 oz     Types: 7 Glasses of wine per week     Comment: glass of wine a day   • Drug use: No     Social History     Social History Narrative   • Not on file     History  "reviewed. No pertinent family history.  No family status information on file.         ROS    The pertinent  ROS findings can be seen in the HPI above.     All other systems reviewed and are negative     Objective:     /78 (BP Location: Left arm, Patient Position: Sitting, BP Cuff Size: Large adult long)   Pulse 77   Temp 36.5 °C (97.7 °F) (Temporal)   Resp 16   Ht 1.753 m (5' 9\")   Wt 90.3 kg (199 lb)   SpO2 94%  Body mass index is 29.39 kg/m².      Physical Exam:    Constitutional: Alert, no distress.  Skin: No suspicious lesions  Eye: Equal, round and reactive, conjunctiva clear, lids normal.  ENMT: Lips without lesions, good dentition, oropharynx clear.  Neck: Trachea midline, no masses, no thyromegaly. No cervical or supraclavicular lymphadenopathy.  Respiratory: Unlabored respiratory effort, lungs clear to auscultation, no wheezes, no ronchi.  Cardiovascular: Normal S1, S2, no murmur, no edema  Abdomen: Soft, non-tender, no masses, no hepatosplenomegaly.  Right lower extremity is significantly larger than left lower extremity from the calf calf is nontender to touch thigh hamstrings are nontender to touch      Assessment and Plan:   The following treatment plan was discussed      1. Right leg swelling  Evaluate for DVT    - US-EXTREMITY VENOUS LOWER BILAT; Future      US confirms DVT, patient was sent to the ER     FINDINGS:   Right lower extremity.    Echolucent material fills the femoral, popliteal and proximal posterior    tibial and peroneal veins consistent with acute, occlusive deep venous    thrombosis. The common femoral, distal posterior tibial, distal peroneal,    and soleal veins are partially compressible consistent with acute, non-   occlusive deep venous thrombosis.    Flow was evaluated in the contralateral common femoral vein and normal    venous flow dynamics including spontaneous flow and respiratory phasic    variation were demonstrated.       Aren Crowell   (Electronically " Signed)   Final Date:      09 November 2021                     11:59             Specimen Collected: 11/09/21 10:51 AM Last Resulted: 11/09/21 11:59 AM                    2. Need for vaccination    Vaccine was administered today without adverse event.    - INFLUENZA VACCINE, HIGH DOSE (65+ ONLY)            Instructed to Follow up in clinic or ER for worsening symptoms, difficulty breathing, lack of expected recovery, or should new symptoms or problems arise.    Followup: Return in about 10 days (around 11/19/2021) for Reevaluation.

## 2021-11-10 ENCOUNTER — ANTICOAGULATION VISIT (OUTPATIENT)
Dept: VASCULAR LAB | Facility: MEDICAL CENTER | Age: 61
End: 2021-11-10
Attending: INTERNAL MEDICINE
Payer: COMMERCIAL

## 2021-11-10 ENCOUNTER — SUPERVISING PHYSICIAN REVIEW (OUTPATIENT)
Dept: VASCULAR LAB | Facility: MEDICAL CENTER | Age: 61
End: 2021-11-10

## 2021-11-10 DIAGNOSIS — Z86.73 HISTORY OF CVA (CEREBROVASCULAR ACCIDENT): ICD-10-CM

## 2021-11-10 DIAGNOSIS — D68.51 FACTOR 5 LEIDEN MUTATION, HETEROZYGOUS (HCC): ICD-10-CM

## 2021-11-10 PROBLEM — I82.409 DEEP VEIN THROMBOSIS (HCC): Status: ACTIVE | Noted: 2021-11-10

## 2021-11-10 PROCEDURE — 99213 OFFICE O/P EST LOW 20 MIN: CPT | Performed by: PHARMACIST

## 2021-11-10 NOTE — PROGRESS NOTES
NEW DOAC   .  Anticoagulation Summary  As of 11/10/2021    INR goal:     TTR:  --   INR used for dosing:  No new INR was available at the time of this encounter.   Warfarin maintenance plan:  No maintenance plan   Next INR check:  11/30/2021   Target end date:      Indications    History of CVA (cerebrovascular accident) [Z86.73]  Factor 5 Leiden mutation  heterozygous (HCC) [D68.51]  Deep vein thrombosis (HCC) [I82.409]             Anticoagulation Episode Summary     INR check location:      Preferred lab:      Send INR reminders to:      Comments:        Anticoagulation Care Providers     Provider Role Specialty Phone number    Dada Weber M.D. Referring Emergency Medicine [phone]    [facility] Responsible  [phone]        Anticoagulation Patient Findings      PCP: Zander Manzanares M.D.  Cardiologist: none  Dx: Acute DVT  CHADSVASC = n/a  HAS-BLED = at least 1  Target End Date = indefinite    Pt Hx:   Patient seen today for initial visit following acute DVT.  Imaging report as follows.    CONCLUSIONS   Right lower extremity.    Echolucent material fills the femoral, popliteal and proximal posterior    tibial and peroneal veins consistent with acute, occlusive deep venous    thrombosis. There is also acute nonocclusive DVT within the common femoral,    posterior tibial, peroneal and soleal veins.    Patient has hx of CVA in 2000, placed on warfarin for ~one year, then transitioned to clopidogrel.  Patient states he has had two to three VTE events since that time, has treated with anticoagulation for short period, then back to clopidogrel d/t cost, most recently in 2019.    Discussed at length antiplatelet vs anticoagulation therapy in setting of multiple VTE and Factor V mutations.  He voices understanding that anticoagulation needed on indefinite basis.    Labs:  Lab Results   Component Value Date/Time    WBC 5.6 04/13/2021 04:11 AM    WBC 9.7 06/28/2019 05:29 PM    RBC 5.32  04/13/2021 04:11 AM    RBC 5.67 06/28/2019 05:29 PM    HEMOGLOBIN 16.8 04/13/2021 04:11 AM    HEMOGLOBIN 16.8 06/28/2019 05:29 PM    HEMATOCRIT 48.2 04/13/2021 04:11 AM    HEMATOCRIT 49.2 06/28/2019 05:29 PM    MCV 91 04/13/2021 04:11 AM    MCV 86.8 06/28/2019 05:29 PM    MCH 31.6 04/13/2021 04:11 AM    MCH 29.6 06/28/2019 05:29 PM    MCHC 34.9 04/13/2021 04:11 AM    MCHC 34.1 06/28/2019 05:29 PM    MPV 10.2 06/28/2019 05:29 PM    NEUTSPOLYS 62 04/13/2021 04:11 AM    NEUTSPOLYS 69.30 06/28/2019 05:29 PM    LYMPHOCYTES 28 04/13/2021 04:11 AM    LYMPHOCYTES 24.30 06/28/2019 05:29 PM    MONOCYTES 7 04/13/2021 04:11 AM    MONOCYTES 4.90 06/28/2019 05:29 PM    EOSINOPHILS 2 04/13/2021 04:11 AM    EOSINOPHILS 1.10 06/28/2019 05:29 PM    BASOPHILS 1 04/13/2021 04:11 AM    BASOPHILS 0.20 06/28/2019 05:29 PM      Lab Results   Component Value Date/Time    SODIUM 143 04/13/2021 04:11 AM    SODIUM 137 06/28/2019 05:29 PM    POTASSIUM 3.6 04/13/2021 04:11 AM    POTASSIUM 3.3 (L) 06/28/2019 05:29 PM    CHLORIDE 105 04/13/2021 04:11 AM    CHLORIDE 98 06/28/2019 05:29 PM    CO2 23 04/13/2021 04:11 AM    CO2 29 06/28/2019 05:29 PM    GLUCOSE 99 04/13/2021 04:11 AM    GLUCOSE 103 (H) 06/28/2019 05:29 PM    BUN 15 04/13/2021 04:11 AM    BUN 17 06/28/2019 05:29 PM    CREATININE 0.92 04/13/2021 04:11 AM    CREATININE 0.99 06/28/2019 05:29 PM    CREATININE 1.2 11/06/2008 01:00 PM    BUNCREATRAT 16 04/13/2021 04:11 AM          Pt is NOT new to Eliquis and new to [facility]. Discussed:   · Indication for DOAC therapy.  · Importance of monitoring and compliance.   · Monitoring parameters, signs and symptoms of bleeding or clotting.  · DOAC therapy, side effects, potential DDIs, OTC medications  · Hormonal therapy - none  · Pregnancy - n/a  · DDI - none  · Pt on antiplatelet/NSAID therapy Plavix 75mg for VTE prophylaxis and will discontinue today.   · Lifestyle safety, ie smoking, ETOH, hobby safety, fall safety/prevention  · Procedures for missed  doses or suspected missed doses, surgeries/procedures, travel, dental work, any medication changes    Start with Eliquis 10mg taken 2 times a day for 1 week and then decrease to 5mg taken 2 times daily thereafter. Pt will transition to 5 mg BID dose on 11-18-21.      DOAC affordable = No, will work with coupon cards or PAP to assure medication available to patient, this was discussed at length with patient.    Samples provided: Yes    Labs to be completed prior to next f/u - CBC, CMP    F/U - Three weeks, suggested two, but he will be out of town.    Vicente Madrigal, PharmD, BCACP    CC Dr Michael Bloch

## 2021-11-11 ENCOUNTER — TELEPHONE (OUTPATIENT)
Dept: VASCULAR LAB | Facility: MEDICAL CENTER | Age: 61
End: 2021-11-11

## 2021-11-11 NOTE — TELEPHONE ENCOUNTER
Status:Approved Elquis 5mg     CaseId:[id number]  Coverage Start Date:10/12/2021  Coverage End Date:11/11/2022    Medication released to [facility]

## 2021-11-30 ENCOUNTER — ANTICOAGULATION VISIT (OUTPATIENT)
Dept: VASCULAR LAB | Facility: MEDICAL CENTER | Age: 61
End: 2021-11-30
Attending: INTERNAL MEDICINE
Payer: COMMERCIAL

## 2021-11-30 VITALS — SYSTOLIC BLOOD PRESSURE: 128 MMHG | DIASTOLIC BLOOD PRESSURE: 83 MMHG | HEART RATE: 52 BPM

## 2021-11-30 DIAGNOSIS — D68.51 FACTOR 5 LEIDEN MUTATION, HETEROZYGOUS (HCC): ICD-10-CM

## 2021-11-30 DIAGNOSIS — Z86.73 HISTORY OF CVA (CEREBROVASCULAR ACCIDENT): ICD-10-CM

## 2021-11-30 PROCEDURE — 99212 OFFICE O/P EST SF 10 MIN: CPT

## 2021-11-30 NOTE — PROGRESS NOTES
Target end date:Indefinite  Indication: Recurrent VTE, hx of CVA + Factor V Leiden  Drug: Eliquis 5 mg bid  CHADsVASC = n/a  HAS-BLED = 1 (hx of CVA)    Health Status Since Last Assessment  Patient denies any new relevant medical problems, ED visits or hospitalizations  Patient denies any embolic events (stroke/tia/systemic embolism)    Adherence with DOAC Therapy  Pt has not missed any doses in the average week    Bleeding Risk Assessment  Denies Epistaxis  Pt denies any excessive or unusual bleeding/hematomas.  Pt denies any GI bleeds or hematemesis.  Pt denies any concerning daily headache or sub dural hematoma symptoms.    Pt denies any hematuria   Latest Hemoglobin pending  Platelets: pending  ETOH overuse denies     Creatinine Clearance/Renal Function  Latest CrCl >104 ml/min    Hepatic function  Latest LFTs pending  Pt denies any history of liver dysfunction      Drug Interactions  Medications reconciled   ASA/other antiplatelets none  NSAID none  Other drug interactions none  Verified no anticonvulsant or azole therapy, education provided for future use.     Examination  Blood Pressure   Vitals:    11/30/21 0910   BP: 128/83   Pulse: (!) 52     Symptomatic hypotension none  Significant gait impairment/imbalance/high risk for falls? none    Final Assessment and Recommendations:  Patient appears stable from the anticoagulation standpoint.    Patient is able to afford DOAC    Benefits of continued DOAC therapy outweigh risks for this patient  Recommend pt continue with current DOAC therapy     Other Actions: cmp/ cbc hemogram ordered prior to next visit    Follow up:  Will follow up with patient 6 months.      Rin Maradiaga, SatnamD

## 2021-12-10 ENCOUNTER — HOSPITAL ENCOUNTER (OUTPATIENT)
Dept: LAB | Facility: MEDICAL CENTER | Age: 61
End: 2021-12-10
Attending: NURSE PRACTITIONER
Payer: COMMERCIAL

## 2021-12-10 DIAGNOSIS — Z86.73 HISTORY OF CVA (CEREBROVASCULAR ACCIDENT): ICD-10-CM

## 2021-12-10 DIAGNOSIS — D68.51 FACTOR 5 LEIDEN MUTATION, HETEROZYGOUS (HCC): ICD-10-CM

## 2021-12-10 LAB
ALBUMIN SERPL BCP-MCNC: 4.3 G/DL (ref 3.2–4.9)
ALBUMIN/GLOB SERPL: 1.5 G/DL
ALP SERPL-CCNC: 78 U/L (ref 30–99)
ALT SERPL-CCNC: 12 U/L (ref 2–50)
ANION GAP SERPL CALC-SCNC: 12 MMOL/L (ref 7–16)
AST SERPL-CCNC: 12 U/L (ref 12–45)
BILIRUB SERPL-MCNC: 1.2 MG/DL (ref 0.1–1.5)
BUN SERPL-MCNC: 19 MG/DL (ref 8–22)
CALCIUM SERPL-MCNC: 9.2 MG/DL (ref 8.5–10.5)
CHLORIDE SERPL-SCNC: 101 MMOL/L (ref 96–112)
CO2 SERPL-SCNC: 28 MMOL/L (ref 20–33)
CREAT SERPL-MCNC: 1.04 MG/DL (ref 0.5–1.4)
ERYTHROCYTE [DISTWIDTH] IN BLOOD BY AUTOMATED COUNT: 45.9 FL (ref 35.9–50)
GLOBULIN SER CALC-MCNC: 2.9 G/DL (ref 1.9–3.5)
GLUCOSE SERPL-MCNC: 102 MG/DL (ref 65–99)
HCT VFR BLD AUTO: 47.5 % (ref 42–52)
HGB BLD-MCNC: 15.9 G/DL (ref 14–18)
MCH RBC QN AUTO: 30.8 PG (ref 27–33)
MCHC RBC AUTO-ENTMCNC: 33.5 G/DL (ref 33.7–35.3)
MCV RBC AUTO: 92.1 FL (ref 81.4–97.8)
PLATELET # BLD AUTO: 258 K/UL (ref 164–446)
PMV BLD AUTO: 11.5 FL (ref 9–12.9)
POTASSIUM SERPL-SCNC: 4 MMOL/L (ref 3.6–5.5)
PROT SERPL-MCNC: 7.2 G/DL (ref 6–8.2)
RBC # BLD AUTO: 5.16 M/UL (ref 4.7–6.1)
SODIUM SERPL-SCNC: 141 MMOL/L (ref 135–145)
WBC # BLD AUTO: 7.8 K/UL (ref 4.8–10.8)

## 2021-12-10 PROCEDURE — 80053 COMPREHEN METABOLIC PANEL: CPT

## 2021-12-10 PROCEDURE — 85027 COMPLETE CBC AUTOMATED: CPT

## 2021-12-10 PROCEDURE — 36415 COLL VENOUS BLD VENIPUNCTURE: CPT

## 2021-12-28 DIAGNOSIS — I10 ESSENTIAL HYPERTENSION: ICD-10-CM

## 2021-12-29 RX ORDER — LOSARTAN POTASSIUM AND HYDROCHLOROTHIAZIDE 25; 100 MG/1; MG/1
TABLET ORAL
Qty: 90 TABLET | Refills: 0 | Status: SHIPPED | OUTPATIENT
Start: 2021-12-29 | End: 2022-04-01

## 2022-03-30 DIAGNOSIS — I10 ESSENTIAL HYPERTENSION: ICD-10-CM

## 2022-04-01 RX ORDER — LOSARTAN POTASSIUM AND HYDROCHLOROTHIAZIDE 25; 100 MG/1; MG/1
TABLET ORAL
Qty: 90 TABLET | Refills: 0 | Status: SHIPPED | OUTPATIENT
Start: 2022-04-01 | End: 2022-07-01 | Stop reason: SDUPTHER

## 2022-06-07 ENCOUNTER — TELEPHONE (OUTPATIENT)
Dept: VASCULAR LAB | Facility: MEDICAL CENTER | Age: 62
End: 2022-06-07
Payer: COMMERCIAL

## 2022-06-07 ENCOUNTER — APPOINTMENT (OUTPATIENT)
Dept: VASCULAR LAB | Facility: MEDICAL CENTER | Age: 62
End: 2022-06-07
Attending: INTERNAL MEDICINE
Payer: COMMERCIAL

## 2022-06-07 NOTE — TELEPHONE ENCOUNTER
[facility]    Pt missed their last appointment. Left VM for pt to reschedule.    Gunner Camilo, PharmD

## 2022-06-14 ENCOUNTER — ANTICOAGULATION VISIT (OUTPATIENT)
Dept: VASCULAR LAB | Facility: MEDICAL CENTER | Age: 62
End: 2022-06-14
Attending: INTERNAL MEDICINE
Payer: COMMERCIAL

## 2022-06-14 DIAGNOSIS — Z86.73 HISTORY OF CVA (CEREBROVASCULAR ACCIDENT): ICD-10-CM

## 2022-06-14 DIAGNOSIS — D68.51 FACTOR 5 LEIDEN MUTATION, HETEROZYGOUS (HCC): ICD-10-CM

## 2022-06-14 PROCEDURE — 99212 OFFICE O/P EST SF 10 MIN: CPT

## 2022-06-14 NOTE — PROGRESS NOTES
Target end date:Indefinite  Indication: Recurrent VTE, hx of CVA + Factor V Leiden  Drug: Eliquis 5 mg bid  CHADsVASC = n/a  HAS-BLED = 1 (hx of CVA)    Health Status Since Last Assessment  Patient denies any new relevant medical problems, ED visits or hospitalizations  Patient denies any embolic events (stroke/tia/systemic embolism)  Pt reports blue discoloration of skin that isn't painful or uncomortable  Started on Losartan-hydrochlorathiazide (100-25mg)    Adherence with DOAC Therapy  Pt has not missed any doses in the average week    Bleeding Risk Assessment  negative Epistaxis  Pt denies any excessive or unusual bleeding/hematomas.  Pt denies any GI bleeds or hematemesis.  Pt denies any concerning daily headache or sub dural hematoma symptoms.    Pt denies any hematuria   Latest Hemoglobin WNL  Platelets: WNL  ETOH overuse Negative     Creatinine Clearance/Renal Function  Latest CrCl 80 mL/min    Hepatic function  Latest LFTs WNL  Pt denies any history of liver dysfunction      Drug Interactions  Medications reconciled   ASA/other antiplatelets None  NSAID None  Other drug interactions n/a  Verified no anticonvulsant or azole therapy, education provided for future use.     Examination  Blood Pressure   There were no vitals filed for this visit.  Symptomatic hypotension none  Significant gait impairment/imbalance/high risk for falls? no    Final Assessment and Recommendations:  Patient appears stable from the anticoagulation standpoint.    Patient is able to afford DOAC    Benefits of continued DOAC therapy outweigh risks for this patient  Recommend pt continue with current DOAC therapy     Other Actions: cmp/ cbc hemogram ordered prior to next visit    Follow up:  Will follow up with patient 6 months.     Rehan Forde, Pharmacy Student     Rin Gallo, PharmD

## 2022-07-01 DIAGNOSIS — I10 ESSENTIAL HYPERTENSION: ICD-10-CM

## 2022-07-01 RX ORDER — LOSARTAN POTASSIUM AND HYDROCHLOROTHIAZIDE 25; 100 MG/1; MG/1
1 TABLET ORAL DAILY
Qty: 90 TABLET | Refills: 2 | Status: SHIPPED | OUTPATIENT
Start: 2022-07-01 | End: 2022-09-21 | Stop reason: SDUPTHER

## 2022-07-01 RX ORDER — AMLODIPINE BESYLATE 5 MG/1
5 TABLET ORAL DAILY
Qty: 90 TABLET | Refills: 3 | Status: SHIPPED | OUTPATIENT
Start: 2022-07-01 | End: 2022-09-07 | Stop reason: SDUPTHER

## 2022-07-01 NOTE — TELEPHONE ENCOUNTER
Received request via: Patient    Was the patient seen in the last year in this department? Yes 11/9/21    Does the patient have an active prescription (recently filled or refills available) for medication(s) requested? No

## 2022-07-22 PROBLEM — M17.11 OSTEOARTHRITIS OF RIGHT KNEE: Status: ACTIVE | Noted: 2022-07-22

## 2022-07-26 ENCOUNTER — OFFICE VISIT (OUTPATIENT)
Dept: MEDICAL GROUP | Age: 62
End: 2022-07-26
Payer: COMMERCIAL

## 2022-07-26 VITALS
DIASTOLIC BLOOD PRESSURE: 64 MMHG | HEART RATE: 79 BPM | OXYGEN SATURATION: 96 % | BODY MASS INDEX: 30.21 KG/M2 | TEMPERATURE: 97.4 F | SYSTOLIC BLOOD PRESSURE: 108 MMHG | RESPIRATION RATE: 16 BRPM | WEIGHT: 204 LBS | HEIGHT: 69 IN

## 2022-07-26 DIAGNOSIS — G89.29 CHRONIC PAIN OF RIGHT KNEE: ICD-10-CM

## 2022-07-26 DIAGNOSIS — M25.561 CHRONIC PAIN OF RIGHT KNEE: ICD-10-CM

## 2022-07-26 DIAGNOSIS — L30.9 DERMATITIS: ICD-10-CM

## 2022-07-26 DIAGNOSIS — M25.512 ACUTE PAIN OF LEFT SHOULDER: ICD-10-CM

## 2022-07-26 PROCEDURE — 99213 OFFICE O/P EST LOW 20 MIN: CPT | Mod: 25 | Performed by: FAMILY MEDICINE

## 2022-07-26 PROCEDURE — 20610 DRAIN/INJ JOINT/BURSA W/O US: CPT | Mod: RT | Performed by: FAMILY MEDICINE

## 2022-07-26 RX ORDER — CLOBETASOL PROPIONATE 0.5 MG/G
EMULSION TOPICAL
Qty: 30 G | Refills: 1 | Status: SHIPPED | OUTPATIENT
Start: 2022-07-26 | End: 2023-03-21

## 2022-07-26 RX ORDER — METHYLPREDNISOLONE SODIUM SUCCINATE 40 MG/ML
40 INJECTION, POWDER, LYOPHILIZED, FOR SOLUTION INTRAMUSCULAR; INTRAVENOUS ONCE
Status: COMPLETED | OUTPATIENT
Start: 2022-07-26 | End: 2022-07-26

## 2022-07-26 RX ORDER — VALACYCLOVIR HYDROCHLORIDE 1 G/1
1000 TABLET, FILM COATED ORAL 2 TIMES DAILY
Qty: 20 TABLET | Refills: 0 | Status: SHIPPED | OUTPATIENT
Start: 2022-07-26 | End: 2022-08-30

## 2022-07-26 RX ADMIN — METHYLPREDNISOLONE SODIUM SUCCINATE 40 MG: 40 INJECTION, POWDER, LYOPHILIZED, FOR SOLUTION INTRAMUSCULAR; INTRAVENOUS at 10:38

## 2022-07-26 ASSESSMENT — FIBROSIS 4 INDEX: FIB4 SCORE: 0.86

## 2022-07-26 ASSESSMENT — PATIENT HEALTH QUESTIONNAIRE - PHQ9: CLINICAL INTERPRETATION OF PHQ2 SCORE: 0

## 2022-07-26 NOTE — PROGRESS NOTES
This medical record contains text that has been entered with the assistance of computer voice recognition and dictation software.  Therefore, it may contain unintended errors in text, spelling, punctuation, or grammar      Chief Complaint   Patient presents with   • Leg Swelling     R leg, open sores on calf, for several months since blood clot, no signs of infection         Enmanuel Zhou is a 62 y.o. male here evaluation and management of: Rash on right leg and right leg pain      HPI:     1. Dermatitis  NEW UNDIAGNOSED PROBLEM    Jc is a very pleasant 62-year-old male who presents to clinic with a chief complaint of having this itchy rash little bit painful on the right shin.  He states there is 3 different spots on their.  He tried topical hydrocortisone cream with some mild improvement in the itchiness.  The rash seems to be drying out    2. Chronic pain of right knee  The patient states that he has had a steroid injection in the past with good results for his chronic pain.  He would like to repeat that today.  He states he sometimes gets a little bit of clicking in the right knee.  There was no trauma to this no popping or locking.    Current medicines (including changes today)  Current Outpatient Medications   Medication Sig Dispense Refill   • valacyclovir (VALTREX) 1 GM Tab Take 1 Tablet by mouth 2 times a day. Take 1 tab po tid at onset of rash x 3 days 20 Tablet 0   • CLOBETASOL PROPIONATE E 0.05 % Cream AAA BID FOR UP TO 14 DAYS THEN STOP 30 g 1   • amLODIPine (NORVASC) 5 MG Tab Take 1 Tablet by mouth every day. 90 Tablet 3   • losartan-hydrochlorothiazide (HYZAAR) 100-25 MG per tablet Take 1 Tablet by mouth every day. 90 Tablet 2   • apixaban (ELIQUIS) 5mg Tab Take 1 Tablet by mouth 2 times a day. 90 Tablet 1     No current facility-administered medications for this visit.     He  has a past medical history of DVT (deep vein thrombosis) in pregnancy and Factor 5 Leiden mutation, heterozygous  "(HCC).  He  has a past surgical history that includes cholo by laparoscopy (11/12/08) and other abdominal surgery.  Social History     Tobacco Use   • Smoking status: Never Smoker   • Smokeless tobacco: Never Used   Vaping Use   • Vaping Use: Never used   Substance Use Topics   • Alcohol use: Yes     Alcohol/week: 4.2 oz     Types: 7 Glasses of wine per week     Comment: glass of wine a day   • Drug use: No     Social History     Social History Narrative   • Not on file     History reviewed. No pertinent family history.  No family status information on file.         ROS    The pertinent  ROS findings can be seen in the HPI above.     All other systems reviewed and are negative     Objective:     /64 (BP Location: Right arm, Patient Position: Sitting, BP Cuff Size: Adult)   Pulse 79   Temp 36.3 °C (97.4 °F) (Temporal)   Resp 16   Ht 1.753 m (5' 9\")   Wt 92.5 kg (204 lb)   SpO2 96%  Body mass index is 30.13 kg/m².      Physical Exam:    Constitutional: Alert, no distress.  Skin:     After informed verbal/appropriate consent patient agreed to proceed with clinical photo to compare to future picture and follow up on progression of disease      Eye: Equal, round and reactive, conjunctiva clear, lids normal.  ENMT: Lips without lesions, good dentition, oropharynx clear.  Neck: Trachea midline, no masses, no thyromegaly. No cervical or supraclavicular lymphadenopathy.  Respiratory: Unlabored respiratory effort, lungs clear to auscultation, no wheezes, no ronchi.  Cardiovascular: Normal S1, S2, no murmur, no edema  Abdomen: Soft, non-tender, no masses, no hepatosplenomegaly.      PROCEDURE NOTE.  Knee Steroid injection  Discussed risk and benefit and patient agrees to steroid injection injection of right knee. The patient was consented. The joint was prepped with betadine. A 25 guage needle was inserted into the anterior medial aspect of the joint. 1 cc of 1% lidocaine without epi and 1 cc of [id number] was " then injected into the joint. The area was cleaned with alcohol swab and band-aid was applied. Patient tolerated procedure well with no complications.      Assessment and Plan:   The following treatment plan was discussed      1. Dermatitis  Appears to be healing over shingles  We will give him Valtrex for the future    - valacyclovir (VALTREX) 1 GM Tab; Take 1 Tablet by mouth 2 times a day. Take 1 tab po tid at onset of rash x 3 days  Dispense: 20 Tablet; Refill: 0  - CLOBETASOL PROPIONATE E 0.05 % Cream; AAA BID FOR UP TO 14 DAYS THEN STOP  Dispense: 30 g; Refill: 1    2. Chronic pain of right knee    Patient tolerated procedure well  There were no adverse events  Patient was given post procedure precautions               Instructed to Follow up in clinic or ER for worsening symptoms, difficulty breathing, lack of expected recovery, or should new symptoms or problems arise.    Followup: Return in about 6 months (around 1/26/2023).

## 2022-08-17 ENCOUNTER — HOSPITAL ENCOUNTER (OUTPATIENT)
Dept: LAB | Facility: MEDICAL CENTER | Age: 62
End: 2022-08-17
Attending: ORTHOPAEDIC SURGERY
Payer: COMMERCIAL

## 2022-08-17 ENCOUNTER — HOSPITAL ENCOUNTER (OUTPATIENT)
Dept: LAB | Facility: MEDICAL CENTER | Age: 62
End: 2022-08-17
Attending: NURSE PRACTITIONER
Payer: COMMERCIAL

## 2022-08-17 DIAGNOSIS — Z86.73 HISTORY OF CVA (CEREBROVASCULAR ACCIDENT): ICD-10-CM

## 2022-08-17 DIAGNOSIS — M17.11 PRIMARY OSTEOARTHRITIS OF RIGHT KNEE: ICD-10-CM

## 2022-08-17 DIAGNOSIS — D68.51 FACTOR 5 LEIDEN MUTATION, HETEROZYGOUS (HCC): ICD-10-CM

## 2022-08-17 LAB
ALBUMIN SERPL BCP-MCNC: 4.2 G/DL (ref 3.2–4.9)
ALBUMIN/GLOB SERPL: 1.6 G/DL
ALP SERPL-CCNC: 67 U/L (ref 30–99)
ALT SERPL-CCNC: 15 U/L (ref 2–50)
ANION GAP SERPL CALC-SCNC: 11 MMOL/L (ref 7–16)
ANION GAP SERPL CALC-SCNC: 9 MMOL/L (ref 7–16)
APPEARANCE UR: CLEAR
APTT PPP: 30.2 SEC (ref 24.7–36)
AST SERPL-CCNC: 12 U/L (ref 12–45)
BASOPHILS # BLD AUTO: 0.6 % (ref 0–1.8)
BASOPHILS # BLD: 0.04 K/UL (ref 0–0.12)
BILIRUB SERPL-MCNC: 1.1 MG/DL (ref 0.1–1.5)
BILIRUB UR QL STRIP.AUTO: NEGATIVE
BUN SERPL-MCNC: 17 MG/DL (ref 8–22)
BUN SERPL-MCNC: 18 MG/DL (ref 8–22)
CALCIUM SERPL-MCNC: 8.8 MG/DL (ref 8.5–10.5)
CALCIUM SERPL-MCNC: 8.9 MG/DL (ref 8.5–10.5)
CHLORIDE SERPL-SCNC: 103 MMOL/L (ref 96–112)
CHLORIDE SERPL-SCNC: 105 MMOL/L (ref 96–112)
CO2 SERPL-SCNC: 26 MMOL/L (ref 20–33)
CO2 SERPL-SCNC: 26 MMOL/L (ref 20–33)
COLOR UR: YELLOW
CREAT SERPL-MCNC: 0.96 MG/DL (ref 0.5–1.4)
CREAT SERPL-MCNC: 0.97 MG/DL (ref 0.5–1.4)
EOSINOPHIL # BLD AUTO: 0.29 K/UL (ref 0–0.51)
EOSINOPHIL NFR BLD: 4.1 % (ref 0–6.9)
ERYTHROCYTE [DISTWIDTH] IN BLOOD BY AUTOMATED COUNT: 44.2 FL (ref 35.9–50)
ERYTHROCYTE [DISTWIDTH] IN BLOOD BY AUTOMATED COUNT: 45.2 FL (ref 35.9–50)
GFR SERPLBLD CREATININE-BSD FMLA CKD-EPI: 87 ML/MIN/1.73 M 2
GFR SERPLBLD CREATININE-BSD FMLA CKD-EPI: 88 ML/MIN/1.73 M 2
GLOBULIN SER CALC-MCNC: 2.7 G/DL (ref 1.9–3.5)
GLUCOSE SERPL-MCNC: 102 MG/DL (ref 65–99)
GLUCOSE SERPL-MCNC: 92 MG/DL (ref 65–99)
GLUCOSE UR STRIP.AUTO-MCNC: NEGATIVE MG/DL
HCT VFR BLD AUTO: 45.8 % (ref 42–52)
HCT VFR BLD AUTO: 45.9 % (ref 42–52)
HGB BLD-MCNC: 15.3 G/DL (ref 14–18)
HGB BLD-MCNC: 15.5 G/DL (ref 14–18)
HIV 1+2 AB+HIV1 P24 AG SERPL QL IA: NORMAL
IMM GRANULOCYTES # BLD AUTO: 0.02 K/UL (ref 0–0.11)
IMM GRANULOCYTES NFR BLD AUTO: 0.3 % (ref 0–0.9)
INR PPP: 1.02 (ref 0.87–1.13)
KETONES UR STRIP.AUTO-MCNC: NEGATIVE MG/DL
LEUKOCYTE ESTERASE UR QL STRIP.AUTO: NEGATIVE
LYMPHOCYTES # BLD AUTO: 1.94 K/UL (ref 1–4.8)
LYMPHOCYTES NFR BLD: 27.3 % (ref 22–41)
MCH RBC QN AUTO: 30.8 PG (ref 27–33)
MCH RBC QN AUTO: 30.9 PG (ref 27–33)
MCHC RBC AUTO-ENTMCNC: 33.4 G/DL (ref 33.7–35.3)
MCHC RBC AUTO-ENTMCNC: 33.8 G/DL (ref 33.7–35.3)
MCV RBC AUTO: 91.6 FL (ref 81.4–97.8)
MCV RBC AUTO: 92.2 FL (ref 81.4–97.8)
MICRO URNS: NORMAL
MONOCYTES # BLD AUTO: 0.49 K/UL (ref 0–0.85)
MONOCYTES NFR BLD AUTO: 6.9 % (ref 0–13.4)
NEUTROPHILS # BLD AUTO: 4.32 K/UL (ref 1.82–7.42)
NEUTROPHILS NFR BLD: 60.8 % (ref 44–72)
NITRITE UR QL STRIP.AUTO: NEGATIVE
NRBC # BLD AUTO: 0 K/UL
NRBC BLD-RTO: 0 /100 WBC
PH UR STRIP.AUTO: 5.5 [PH] (ref 5–8)
PLATELET # BLD AUTO: 205 K/UL (ref 164–446)
PLATELET # BLD AUTO: 212 K/UL (ref 164–446)
PMV BLD AUTO: 11.8 FL (ref 9–12.9)
PMV BLD AUTO: 11.9 FL (ref 9–12.9)
POTASSIUM SERPL-SCNC: 3.7 MMOL/L (ref 3.6–5.5)
POTASSIUM SERPL-SCNC: 3.8 MMOL/L (ref 3.6–5.5)
PROT SERPL-MCNC: 6.9 G/DL (ref 6–8.2)
PROT UR QL STRIP: NEGATIVE MG/DL
PROTHROMBIN TIME: 13.3 SEC (ref 12–14.6)
RBC # BLD AUTO: 4.97 M/UL (ref 4.7–6.1)
RBC # BLD AUTO: 5.01 M/UL (ref 4.7–6.1)
RBC UR QL AUTO: NEGATIVE
SODIUM SERPL-SCNC: 140 MMOL/L (ref 135–145)
SODIUM SERPL-SCNC: 140 MMOL/L (ref 135–145)
SP GR UR STRIP.AUTO: 1.02
UROBILINOGEN UR STRIP.AUTO-MCNC: 0.2 MG/DL
WBC # BLD AUTO: 7 K/UL (ref 4.8–10.8)
WBC # BLD AUTO: 7.1 K/UL (ref 4.8–10.8)

## 2022-08-17 PROCEDURE — 80053 COMPREHEN METABOLIC PANEL: CPT

## 2022-08-17 PROCEDURE — 36415 COLL VENOUS BLD VENIPUNCTURE: CPT

## 2022-08-17 PROCEDURE — 85027 COMPLETE CBC AUTOMATED: CPT

## 2022-08-17 PROCEDURE — 81003 URINALYSIS AUTO W/O SCOPE: CPT

## 2022-08-17 PROCEDURE — 85730 THROMBOPLASTIN TIME PARTIAL: CPT

## 2022-08-17 PROCEDURE — 87389 HIV-1 AG W/HIV-1&-2 AB AG IA: CPT

## 2022-08-17 PROCEDURE — 80048 BASIC METABOLIC PNL TOTAL CA: CPT

## 2022-08-17 PROCEDURE — 85025 COMPLETE CBC W/AUTO DIFF WBC: CPT

## 2022-08-17 PROCEDURE — 85610 PROTHROMBIN TIME: CPT

## 2022-09-07 DIAGNOSIS — I10 ESSENTIAL HYPERTENSION: ICD-10-CM

## 2022-09-08 RX ORDER — AMLODIPINE BESYLATE 5 MG/1
5 TABLET ORAL DAILY
Qty: 90 TABLET | Refills: 3 | Status: SHIPPED | OUTPATIENT
Start: 2022-09-08 | End: 2022-09-21 | Stop reason: SDUPTHER

## 2022-09-21 DIAGNOSIS — I10 ESSENTIAL HYPERTENSION: ICD-10-CM

## 2022-09-21 RX ORDER — AMLODIPINE BESYLATE 5 MG/1
5 TABLET ORAL DAILY
Qty: 90 TABLET | Refills: 3 | Status: SHIPPED | OUTPATIENT
Start: 2022-09-21 | End: 2023-10-04

## 2022-09-21 RX ORDER — LOSARTAN POTASSIUM AND HYDROCHLOROTHIAZIDE 25; 100 MG/1; MG/1
1 TABLET ORAL DAILY
Qty: 90 TABLET | Refills: 2 | Status: SHIPPED | OUTPATIENT
Start: 2022-09-21 | End: 2023-07-05

## 2022-09-26 DIAGNOSIS — I10 ESSENTIAL HYPERTENSION: ICD-10-CM

## 2022-09-26 RX ORDER — AMLODIPINE BESYLATE 5 MG/1
5 TABLET ORAL DAILY
Qty: 90 TABLET | Refills: 3 | OUTPATIENT
Start: 2022-09-26

## 2022-09-26 RX ORDER — LOSARTAN POTASSIUM AND HYDROCHLOROTHIAZIDE 25; 100 MG/1; MG/1
1 TABLET ORAL DAILY
Qty: 90 TABLET | Refills: 2 | OUTPATIENT
Start: 2022-09-26

## 2022-09-26 NOTE — TELEPHONE ENCOUNTER
Received request via: Patient    Was the patient seen in the last year in this department? Yes    Does the patient have an active prescription (recently filled or refills available) for medication(s) requested?  Patient is requesting amLODIPine (NORVASC) 5 MG Tab [facility]. Pharmacy has been updated.

## 2022-10-05 ENCOUNTER — HOSPITAL ENCOUNTER (OUTPATIENT)
Dept: LAB | Facility: MEDICAL CENTER | Age: 62
End: 2022-10-05
Attending: FAMILY MEDICINE
Payer: COMMERCIAL

## 2022-10-05 ENCOUNTER — OFFICE VISIT (OUTPATIENT)
Dept: MEDICAL GROUP | Age: 62
End: 2022-10-05
Payer: COMMERCIAL

## 2022-10-05 VITALS
OXYGEN SATURATION: 99 % | RESPIRATION RATE: 16 BRPM | BODY MASS INDEX: 30.57 KG/M2 | DIASTOLIC BLOOD PRESSURE: 68 MMHG | WEIGHT: 206.4 LBS | TEMPERATURE: 97.2 F | SYSTOLIC BLOOD PRESSURE: 122 MMHG | HEIGHT: 69 IN | HEART RATE: 71 BPM

## 2022-10-05 DIAGNOSIS — R25.2 LEG CRAMPING: ICD-10-CM

## 2022-10-05 DIAGNOSIS — L30.9 DERMATITIS: ICD-10-CM

## 2022-10-05 DIAGNOSIS — M17.11 PRIMARY OSTEOARTHRITIS OF RIGHT KNEE: ICD-10-CM

## 2022-10-05 DIAGNOSIS — Z23 NEED FOR VACCINATION: ICD-10-CM

## 2022-10-05 LAB
ANION GAP SERPL CALC-SCNC: 10 MMOL/L (ref 7–16)
BUN SERPL-MCNC: 19 MG/DL (ref 8–22)
CALCIUM SERPL-MCNC: 9.2 MG/DL (ref 8.5–10.5)
CHLORIDE SERPL-SCNC: 102 MMOL/L (ref 96–112)
CO2 SERPL-SCNC: 27 MMOL/L (ref 20–33)
CREAT SERPL-MCNC: 0.89 MG/DL (ref 0.5–1.4)
FASTING STATUS PATIENT QL REPORTED: NORMAL
GFR SERPLBLD CREATININE-BSD FMLA CKD-EPI: 95 ML/MIN/1.73 M 2
GLUCOSE SERPL-MCNC: 97 MG/DL (ref 65–99)
MAGNESIUM SERPL-MCNC: 2.1 MG/DL (ref 1.5–2.5)
POTASSIUM SERPL-SCNC: 3.7 MMOL/L (ref 3.6–5.5)
SODIUM SERPL-SCNC: 139 MMOL/L (ref 135–145)

## 2022-10-05 PROCEDURE — 99214 OFFICE O/P EST MOD 30 MIN: CPT | Mod: 25 | Performed by: FAMILY MEDICINE

## 2022-10-05 PROCEDURE — 90686 IIV4 VACC NO PRSV 0.5 ML IM: CPT | Performed by: FAMILY MEDICINE

## 2022-10-05 PROCEDURE — 36415 COLL VENOUS BLD VENIPUNCTURE: CPT

## 2022-10-05 PROCEDURE — 90471 IMMUNIZATION ADMIN: CPT | Performed by: FAMILY MEDICINE

## 2022-10-05 PROCEDURE — 83735 ASSAY OF MAGNESIUM: CPT

## 2022-10-05 PROCEDURE — 80048 BASIC METABOLIC PNL TOTAL CA: CPT

## 2022-10-05 RX ORDER — ROPINIROLE 0.5 MG/1
0.5 TABLET, FILM COATED ORAL 3 TIMES DAILY
Qty: 90 TABLET | Refills: 1 | Status: SHIPPED | OUTPATIENT
Start: 2022-10-05 | End: 2023-03-13

## 2022-10-05 RX ORDER — FOLIC ACID 0.8 MG
TABLET ORAL
Qty: 30 CAPSULE | Refills: 2 | Status: SHIPPED | OUTPATIENT
Start: 2022-10-05 | End: 2023-03-21

## 2022-10-05 ASSESSMENT — FIBROSIS 4 INDEX: FIB4 SCORE: 0.97

## 2022-10-05 NOTE — PROGRESS NOTES
This medical record contains text that has been entered with the assistance of computer voice recognition and dictation software.  Therefore, it may contain unintended errors in text, spelling, punctuation, or grammar      Chief Complaint   Patient presents with    Paperwork     Handicap Placard Renewal    Follow-Up    Rash     Rash has felt slightly better even after medication, hasn't changed much         Enmanuel Zhou is a 62 y.o. male here evaluation and management of: Several issues      HPI:           1. Primary osteoarthritis of right knee  Rd has a history of osteoarthritis of the right knee he has trouble walking without pain.  As result he needs a handicap placard until he can have total knee replacement.    2. Dermatitis  The patient states that the rash that he developed after having a DVT he believes was improving with the clobetasol we also thought it may resemble shingles he was treated with antivirals, the pain and itching has improved but the visual rash is still there.    3. Leg cramping  NEW UNDIAGNOSED PROBLEM    Patient states he has been having some right leg cramping for several months now.  It usually at night he has to shake it out sometimes it so tight he feels like his legs again squeeze his bones.  He denies any trauma to that region.    4. Need for vaccination  Due for flu vaccine    Current medicines (including changes today)  Current Outpatient Medications   Medication Sig Dispense Refill    Magnesium 500 MG Cap Take one tab po q24h 30 Capsule 2    ROPINIRole (REQUIP) 0.5 MG Tab Take 1 Tablet by mouth 3 times a day. 90 Tablet 1    losartan-hydrochlorothiazide (HYZAAR) 100-25 MG per tablet Take 1 Tablet by mouth every day. 90 Tablet 2    amLODIPine (NORVASC) 5 MG Tab Take 1 Tablet by mouth every day. 90 Tablet 3    POTASSIUM PO Take  by mouth.      CLOBETASOL PROPIONATE E 0.05 % Cream AAA BID FOR UP TO 14 DAYS THEN STOP 30 g 1    apixaban (ELIQUIS) 5mg Tab Take 1 Tablet by  "mouth 2 times a day. 90 Tablet 1     No current facility-administered medications for this visit.     He  has a past medical history of Factor 5 Leiden mutation, heterozygous (HCC).  He  has a past surgical history that includes cholo by laparoscopy (11/12/08) and other abdominal surgery.  Social History     Tobacco Use    Smoking status: Never    Smokeless tobacco: Never   Vaping Use    Vaping Use: Never used   Substance Use Topics    Alcohol use: Yes     Alcohol/week: 4.2 oz     Types: 7 Glasses of wine per week     Comment: glass of wine a day    Drug use: No     Social History     Social History Narrative    Not on file     History reviewed. No pertinent family history.  No family status information on file.         ROS    The pertinent  ROS findings can be seen in the HPI above.     All other systems reviewed and are negative     Objective:     /68 (BP Location: Right arm, Patient Position: Sitting, BP Cuff Size: Adult long)   Pulse 71   Temp 36.2 °C (97.2 °F) (Temporal)   Resp 16   Ht 1.753 m (5' 9\")   Wt 93.6 kg (206 lb 6.4 oz)   SpO2 99%  Body mass index is 30.48 kg/m².      Physical Exam:    Constitutional: Alert, no distress.  Skin:     After informed verbal/appropriate consent patient agreed to proceed with clinical photo to compare to future picture and follow up on progression of disease       Eye: Equal, round and reactive, conjunctiva clear, lids normal.  ENMT: Lips without lesions, good dentition, oropharynx clear.  Neck: Trachea midline, no masses, no thyromegaly. No cervical or supraclavicular lymphadenopathy.  Respiratory: Unlabored respiratory effort, lungs clear to auscultation, no wheezes, no ronchi.  Cardiovascular: Normal S1, S2, no murmur, no edema  Abdomen: Soft, non-tender, no masses, no hepatosplenomegaly.  Extremities--- right thigh hamstring and calf nontender to touch normal flexion extension of the knee.      Assessment and Plan:   The following treatment plan was " discussed      1. Primary osteoarthritis of right knee  DMV paperwork for handicap placard has been filled out.  He will follow-up with surgery    2. Dermatitis    We will refer to dermatology for appropriate evaluation    - Referral to Dermatology    3. Leg cramping  Obtain baseline electrolytes including magnesium  Begin magnesium supplementation as well as Requip    - Basic Metabolic Panel; Future  - MAGNESIUM  - Magnesium 500 MG Cap; Take one tab po q24h  Dispense: 30 Capsule; Refill: 2  - ROPINIRole (REQUIP) 0.5 MG Tab; Take 1 Tablet by mouth 3 times a day.  Dispense: 90 Tablet; Refill: 1    4. Need for vaccination    Vaccine was administered today without adverse event.    - INFLUENZA VACCINE QUAD INJ (PF)            Instructed to Follow up in clinic or ER for worsening symptoms, difficulty breathing, lack of expected recovery, or should new symptoms or problems arise.    Followup: Return in about 6 months (around 4/5/2023) for Reevaluation, labs.

## 2022-10-18 DIAGNOSIS — D68.51 FACTOR 5 LEIDEN MUTATION, HETEROZYGOUS (HCC): ICD-10-CM

## 2022-10-18 DIAGNOSIS — Z86.73 HISTORY OF CVA (CEREBROVASCULAR ACCIDENT): ICD-10-CM

## 2022-10-19 RX ORDER — APIXABAN 5 MG/1
TABLET, FILM COATED ORAL
Qty: 180 TABLET | Refills: 1 | Status: SHIPPED | OUTPATIENT
Start: 2022-10-19 | End: 2023-04-04 | Stop reason: SDUPTHER

## 2022-10-19 NOTE — TELEPHONE ENCOUNTER
Received request via: Patient    Was the patient seen in the last year in this department? Yes    Does the patient have an active prescription (recently filled or refills available) for medication(s) requested? No    Patient states that he is going to run out of this medication tomorrow. Needs a refill asap. Please advise.

## 2022-10-21 ENCOUNTER — DOCUMENTATION (OUTPATIENT)
Dept: VASCULAR LAB | Facility: MEDICAL CENTER | Age: 62
End: 2022-10-21
Payer: COMMERCIAL

## 2022-10-24 ENCOUNTER — APPOINTMENT (RX ONLY)
Dept: URBAN - METROPOLITAN AREA CLINIC 4 | Facility: CLINIC | Age: 62
Setting detail: DERMATOLOGY
End: 2022-10-24

## 2022-10-24 DIAGNOSIS — L43.8 OTHER LICHEN PLANUS: ICD-10-CM

## 2022-10-24 PROBLEM — L30.9 DERMATITIS, UNSPECIFIED: Status: ACTIVE | Noted: 2022-10-24

## 2022-10-24 PROCEDURE — ? PRESCRIPTION

## 2022-10-24 PROCEDURE — ? ADDITIONAL NOTES

## 2022-10-24 PROCEDURE — ? BIOPSY BY PUNCH METHOD

## 2022-10-24 PROCEDURE — 11104 PUNCH BX SKIN SINGLE LESION: CPT

## 2022-10-24 PROCEDURE — ? COUNSELING

## 2022-10-24 RX ORDER — CLOBETASOL PROPIONATE 0.5 MG/G
1 CREAM TOPICAL BID
Qty: 30 | Refills: 0 | Status: ERX | COMMUNITY
Start: 2022-10-24

## 2022-10-24 RX ADMIN — CLOBETASOL PROPIONATE 1: 0.5 CREAM TOPICAL at 00:00

## 2022-10-24 ASSESSMENT — LOCATION SIMPLE DESCRIPTION DERM: LOCATION SIMPLE: RIGHT PRETIBIAL REGION

## 2022-10-24 ASSESSMENT — LOCATION DETAILED DESCRIPTION DERM: LOCATION DETAILED: RIGHT DISTAL PRETIBIAL REGION

## 2022-10-24 ASSESSMENT — LOCATION ZONE DERM: LOCATION ZONE: LEG

## 2022-10-24 NOTE — PROCEDURE: ADDITIONAL NOTES
Detail Level: Simple
Render Risk Assessment In Note?: no
Additional Notes: Note for Saji: pt needed refill of clobetasol.

## 2022-11-01 ENCOUNTER — TELEPHONE (OUTPATIENT)
Dept: MEDICAL GROUP | Age: 62
End: 2022-11-01
Payer: COMMERCIAL

## 2022-12-13 ENCOUNTER — ANTICOAGULATION VISIT (OUTPATIENT)
Dept: VASCULAR LAB | Facility: MEDICAL CENTER | Age: 62
End: 2022-12-13
Attending: INTERNAL MEDICINE
Payer: COMMERCIAL

## 2022-12-13 VITALS — HEART RATE: 66 BPM | DIASTOLIC BLOOD PRESSURE: 79 MMHG | SYSTOLIC BLOOD PRESSURE: 125 MMHG

## 2022-12-13 DIAGNOSIS — I82.409 DEEP VEIN THROMBOSIS (DVT) OF LOWER EXTREMITY, UNSPECIFIED CHRONICITY, UNSPECIFIED LATERALITY, UNSPECIFIED VEIN (HCC): ICD-10-CM

## 2022-12-13 DIAGNOSIS — D68.51 FACTOR 5 LEIDEN MUTATION, HETEROZYGOUS (HCC): ICD-10-CM

## 2022-12-13 DIAGNOSIS — Z86.73 HISTORY OF CVA (CEREBROVASCULAR ACCIDENT): ICD-10-CM

## 2022-12-13 PROCEDURE — 99212 OFFICE O/P EST SF 10 MIN: CPT

## 2022-12-13 NOTE — PROGRESS NOTES
Anticoagulation Summary  As of 12/13/2022      INR goal:     TTR:  --   INR used for dosing:     Warfarin maintenance plan:  No maintenance plan   Next INR check:  6/13/2023   Priority:  Maintenance   Target end date:  Indefinite    Indications    History of CVA (cerebrovascular accident) [Z86.73]  Factor 5 Leiden mutation  heterozygous (HCC) [D68.51]  Deep vein thrombosis (HCC) [I82.409]                 Anticoagulation Episode Summary       INR check location:      Preferred lab:      Send INR reminders to:      Comments:  Eliquis          Anticoagulation Care Providers       Provider Role Specialty Phone number    Dada Weber M.D. Referring Emergency Medicine [phone]    [facility] Responsible  [phone]           Target end date: Indefinite     Indication: Recurrent VTE, hx of CVA and Factor V Leiden     Drug: Eliquis 5mg BID     CHADsVASC = n/a      HAS-BLED = 1 (hx of CVA)    Health Status Since Last Assessment   Patient denies any new relevant medical problems, ED visits or hospitalizations   Patient denies any embolic events (stroke/tia/systemic embolism)    Adherence with DOAC Therapy   Pt has not missed any doses in the average week    Bleeding Risk Assessment     Denies Epistaxis   Pt denies any excessive or unusual bleeding/hematomas.  Pt denies any GI bleeds or hematemesis.  Pt denies any concerning daily headache or sub dural hematoma symptoms.     Pt denies any hematuria    Latest Hemoglobin 15.3   ETOH overuse drinks 4x week      Creatinine Clearance/Renal Function     Latest ClCr ~ 100ml/min   Latest Reference Range & Units 10/05/22 08:39   Creatinine 0.50 - 1.40 mg/dL 0.89   GFR (CKD-EPI) >60 mL/min/1.73 m 2 95     Hepatic function   Latest LFTs WNL   Pt denies any history of liver dysfunction    Latest Reference Range & Units 08/17/22 07:04   AST(SGOT) 12 - 45 U/L 12   ALT(SGPT) 2 - 50 U/L 15      Drug Interactions   Platelets: 205   ASA/other antiplatelets  denies   NSAID none   Other drug interactions none   Verified no anticonvulsant or azole therapy, education provided for future use.     Examination   Blood Pressure 125/79   Symptomatic hypotension denies   Significant gait impairment/imbalance/high risk for falls? none    Final Assessment and Recommendations:   Patient appears stable from the anticoagulation standpoint.     Benefits of continued DOAC therapy outweigh risks for this patient   Recommend pt continue with current DOAC therapy Eliquis 5mg BID   DOAC is affordable     Other Actions: cmp/ cbc hemogram ordered prior to next visit    Follow up:   Will follow up with patient 6 months.     Tai HayD

## 2023-01-18 ENCOUNTER — HOSPITAL ENCOUNTER (OUTPATIENT)
Facility: MEDICAL CENTER | Age: 63
End: 2023-01-18
Attending: ANESTHESIOLOGY
Payer: COMMERCIAL

## 2023-01-18 LAB
SARS-COV-2 RNA RESP QL NAA+PROBE: DETECTED
SPECIMEN SOURCE: ABNORMAL

## 2023-01-18 PROCEDURE — U0003 INFECTIOUS AGENT DETECTION BY NUCLEIC ACID (DNA OR RNA); SEVERE ACUTE RESPIRATORY SYNDROME CORONAVIRUS 2 (SARS-COV-2) (CORONAVIRUS DISEASE [COVID-19]), AMPLIFIED PROBE TECHNIQUE, MAKING USE OF HIGH THROUGHPUT TECHNOLOGIES AS DESCRIBED BY CMS-2020-01-R: HCPCS

## 2023-01-18 PROCEDURE — U0005 INFEC AGEN DETEC AMPLI PROBE: HCPCS

## 2023-03-21 ENCOUNTER — HOSPITAL ENCOUNTER (EMERGENCY)
Facility: MEDICAL CENTER | Age: 63
End: 2023-03-21
Attending: EMERGENCY MEDICINE
Payer: MEDICARE

## 2023-03-21 ENCOUNTER — APPOINTMENT (OUTPATIENT)
Dept: MEDICAL GROUP | Facility: MEDICAL CENTER | Age: 63
End: 2023-03-21
Payer: MEDICARE

## 2023-03-21 ENCOUNTER — APPOINTMENT (OUTPATIENT)
Dept: RADIOLOGY | Facility: MEDICAL CENTER | Age: 63
End: 2023-03-21
Attending: EMERGENCY MEDICINE
Payer: MEDICARE

## 2023-03-21 ENCOUNTER — OFFICE VISIT (OUTPATIENT)
Dept: MEDICAL GROUP | Facility: MEDICAL CENTER | Age: 63
End: 2023-03-21
Payer: MEDICARE

## 2023-03-21 VITALS
SYSTOLIC BLOOD PRESSURE: 110 MMHG | WEIGHT: 214.51 LBS | BODY MASS INDEX: 31.77 KG/M2 | DIASTOLIC BLOOD PRESSURE: 64 MMHG | HEART RATE: 100 BPM | OXYGEN SATURATION: 97 % | TEMPERATURE: 97 F | HEIGHT: 69 IN

## 2023-03-21 VITALS
RESPIRATION RATE: 14 BRPM | SYSTOLIC BLOOD PRESSURE: 129 MMHG | OXYGEN SATURATION: 97 % | BODY MASS INDEX: 31.35 KG/M2 | WEIGHT: 211.64 LBS | TEMPERATURE: 97.5 F | HEART RATE: 92 BPM | HEIGHT: 69 IN | DIASTOLIC BLOOD PRESSURE: 83 MMHG

## 2023-03-21 DIAGNOSIS — Z96.651 HX OF TOTAL KNEE ARTHROPLASTY, RIGHT: ICD-10-CM

## 2023-03-21 DIAGNOSIS — M25.571 RIGHT ANKLE PAIN, UNSPECIFIED CHRONICITY: ICD-10-CM

## 2023-03-21 DIAGNOSIS — M79.89 LEG SWELLING: ICD-10-CM

## 2023-03-21 DIAGNOSIS — R60.0 LOWER EXTREMITY EDEMA: ICD-10-CM

## 2023-03-21 DIAGNOSIS — D68.51 FACTOR 5 LEIDEN MUTATION, HETEROZYGOUS (HCC): ICD-10-CM

## 2023-03-21 LAB
ALBUMIN SERPL BCP-MCNC: 3.6 G/DL (ref 3.2–4.9)
ALBUMIN/GLOB SERPL: 1 G/DL
ALP SERPL-CCNC: 68 U/L (ref 30–99)
ALT SERPL-CCNC: 18 U/L (ref 2–50)
ANION GAP SERPL CALC-SCNC: 10 MMOL/L (ref 7–16)
APTT PPP: 33.2 SEC (ref 24.7–36)
AST SERPL-CCNC: 16 U/L (ref 12–45)
BASOPHILS # BLD AUTO: 0.4 % (ref 0–1.8)
BASOPHILS # BLD: 0.04 K/UL (ref 0–0.12)
BILIRUB SERPL-MCNC: 1.4 MG/DL (ref 0.1–1.5)
BUN SERPL-MCNC: 24 MG/DL (ref 8–22)
CALCIUM ALBUM COR SERPL-MCNC: 9.4 MG/DL (ref 8.5–10.5)
CALCIUM SERPL-MCNC: 9.1 MG/DL (ref 8.4–10.2)
CHLORIDE SERPL-SCNC: 99 MMOL/L (ref 96–112)
CO2 SERPL-SCNC: 26 MMOL/L (ref 20–33)
CREAT SERPL-MCNC: 0.84 MG/DL (ref 0.5–1.4)
EKG IMPRESSION: NORMAL
EOSINOPHIL # BLD AUTO: 0.37 K/UL (ref 0–0.51)
EOSINOPHIL NFR BLD: 3.5 % (ref 0–6.9)
ERYTHROCYTE [DISTWIDTH] IN BLOOD BY AUTOMATED COUNT: 41.8 FL (ref 35.9–50)
GFR SERPLBLD CREATININE-BSD FMLA CKD-EPI: 97 ML/MIN/1.73 M 2
GLOBULIN SER CALC-MCNC: 3.6 G/DL (ref 1.9–3.5)
GLUCOSE SERPL-MCNC: 130 MG/DL (ref 65–99)
HCT VFR BLD AUTO: 43.3 % (ref 42–52)
HGB BLD-MCNC: 14.5 G/DL (ref 14–18)
IMM GRANULOCYTES # BLD AUTO: 0.05 K/UL (ref 0–0.11)
IMM GRANULOCYTES NFR BLD AUTO: 0.5 % (ref 0–0.9)
INR PPP: 1.14 (ref 0.87–1.13)
LYMPHOCYTES # BLD AUTO: 1.42 K/UL (ref 1–4.8)
LYMPHOCYTES NFR BLD: 13.3 % (ref 22–41)
MCH RBC QN AUTO: 29.8 PG (ref 27–33)
MCHC RBC AUTO-ENTMCNC: 33.5 G/DL (ref 33.7–35.3)
MCV RBC AUTO: 88.9 FL (ref 81.4–97.8)
MONOCYTES # BLD AUTO: 0.81 K/UL (ref 0–0.85)
MONOCYTES NFR BLD AUTO: 7.6 % (ref 0–13.4)
NEUTROPHILS # BLD AUTO: 8.02 K/UL (ref 1.82–7.42)
NEUTROPHILS NFR BLD: 74.7 % (ref 44–72)
NRBC # BLD AUTO: 0 K/UL
NRBC BLD-RTO: 0 /100 WBC
NT-PROBNP SERPL IA-MCNC: 18 PG/ML (ref 0–125)
PLATELET # BLD AUTO: 324 K/UL (ref 164–446)
PMV BLD AUTO: 9.7 FL (ref 9–12.9)
POTASSIUM SERPL-SCNC: 3.7 MMOL/L (ref 3.6–5.5)
PROT SERPL-MCNC: 7.2 G/DL (ref 6–8.2)
PROTHROMBIN TIME: 14.4 SEC (ref 12–14.6)
RBC # BLD AUTO: 4.87 M/UL (ref 4.7–6.1)
SODIUM SERPL-SCNC: 135 MMOL/L (ref 135–145)
TROPONIN T SERPL-MCNC: 8 NG/L (ref 6–19)
WBC # BLD AUTO: 10.7 K/UL (ref 4.8–10.8)

## 2023-03-21 PROCEDURE — 93005 ELECTROCARDIOGRAM TRACING: CPT | Performed by: EMERGENCY MEDICINE

## 2023-03-21 PROCEDURE — 85730 THROMBOPLASTIN TIME PARTIAL: CPT

## 2023-03-21 PROCEDURE — 83880 ASSAY OF NATRIURETIC PEPTIDE: CPT

## 2023-03-21 PROCEDURE — 84484 ASSAY OF TROPONIN QUANT: CPT

## 2023-03-21 PROCEDURE — 36415 COLL VENOUS BLD VENIPUNCTURE: CPT

## 2023-03-21 PROCEDURE — 99204 OFFICE O/P NEW MOD 45 MIN: CPT | Performed by: INTERNAL MEDICINE

## 2023-03-21 PROCEDURE — 85025 COMPLETE CBC W/AUTO DIFF WBC: CPT

## 2023-03-21 PROCEDURE — 700117 HCHG RX CONTRAST REV CODE 255: Performed by: EMERGENCY MEDICINE

## 2023-03-21 PROCEDURE — 80053 COMPREHEN METABOLIC PANEL: CPT

## 2023-03-21 PROCEDURE — 85610 PROTHROMBIN TIME: CPT

## 2023-03-21 PROCEDURE — 99284 EMERGENCY DEPT VISIT MOD MDM: CPT

## 2023-03-21 PROCEDURE — 71045 X-RAY EXAM CHEST 1 VIEW: CPT

## 2023-03-21 PROCEDURE — 71275 CT ANGIOGRAPHY CHEST: CPT

## 2023-03-21 PROCEDURE — 93970 EXTREMITY STUDY: CPT

## 2023-03-21 RX ORDER — CLOBETASOL PROPIONATE 0.5 MG/G
1 CREAM TOPICAL 2 TIMES DAILY PRN
Status: SHIPPED | COMMUNITY
End: 2023-04-04 | Stop reason: SDUPTHER

## 2023-03-21 RX ORDER — ACETAMINOPHEN 500 MG
1000 TABLET ORAL EVERY 6 HOURS PRN
COMMUNITY

## 2023-03-21 RX ORDER — OXYCODONE HYDROCHLORIDE 5 MG/1
5 TABLET ORAL EVERY 4 HOURS PRN
Status: SHIPPED | COMMUNITY
End: 2023-04-04

## 2023-03-21 RX ADMIN — IOHEXOL 100 ML: 350 INJECTION, SOLUTION INTRAVENOUS at 08:00

## 2023-03-21 ASSESSMENT — FIBROSIS 4 INDEX
FIB4 SCORE: 0.98
FIB4 SCORE: 0.98

## 2023-03-21 ASSESSMENT — ENCOUNTER SYMPTOMS
CHILLS: 0
FEVER: 0

## 2023-03-21 ASSESSMENT — PATIENT HEALTH QUESTIONNAIRE - PHQ9: CLINICAL INTERPRETATION OF PHQ2 SCORE: 0

## 2023-03-21 NOTE — DISCHARGE INSTRUCTIONS
You were seen in the ER for swelling and pain in the right ankle.  Thankfully, your labs and imaging are reassuring against a blood clot.  Your physical exam does not suggest an infection.  You are safe to go home.  I would like you to follow-up with your orthopedic surgeon as soon as possible, please call his office today to alert him that you were seen in the ER.  Continue taking all of your medications as directed.  Return immediately with new or worsening symptoms.  I hope you feel better soon!

## 2023-03-21 NOTE — ED NOTES
Pt states they have had DVT's in the past and these symptoms today feel similar. Pt states they had a right knee replacement on Monday 03/13/2023.Pt reports they now have swelling and pain in right ankle.

## 2023-03-21 NOTE — ED NOTES
Med rec updated and complete, per pt   Allergies reviewed, per pt  Interviewed pt with sister at bedside with permission from pt.

## 2023-03-21 NOTE — ED NOTES
0758:  PIV placed, blood drawn and sent to lab.  Pt and visitor updated on POC including pending tests and chart review by ERP.

## 2023-03-21 NOTE — PROGRESS NOTES
"Subjective:     Chief Complaint   Patient presents with    Ankle Pain     Knee replacement 3/13    Rash     Back      Diagnoses of Hx of total knee arthroplasty, right, Factor 5 Leiden mutation, heterozygous (HCC), and Lower extremity edema were pertinent to this visit.    HPI: Enmanuel is a pleasant 63 y.o. male who presents today with his wife for acute visit:    Problem   Hx of Total Knee Arthroplasty, Right    Status post right knee total arthroplasty on 3/13/2023.       Lower Extremity Edema    2+ pitting unilateral right lower extremity edema.  Onset 3 days ago in the settings of recent TKA on 3/13/2023.  Reports compliance with Eliquis 5 mg twice daily, has not missed any doses.  Concern for failed anticoagulation and new onset DVT, considering recent surgery and potential need for parenteral anticoagulation, will direct patient to emergency room for further evaluation and treatment.       Factor 5 Leiden Mutation, Heterozygous (Hcc)    History of DVT, currently on Eliquis 5 mg twice daily.         Past Medical History:   Diagnosis Date    Factor 5 Leiden mutation, heterozygous (HCC)      Current Outpatient Medications Ordered in Epic   Medication Sig Dispense Refill    ELIQUIS 5 MG Tab Take 1 tablet by mouth twice daily 180 Tablet 1    losartan-hydrochlorothiazide (HYZAAR) 100-25 MG per tablet Take 1 Tablet by mouth every day. 90 Tablet 2    amLODIPine (NORVASC) 5 MG Tab Take 1 Tablet by mouth every day. 90 Tablet 3    CLOBETASOL PROPIONATE E 0.05 % Cream AAA BID FOR UP TO 14 DAYS THEN STOP 30 g 1     No current [facility]-ordered facility-administered medications on file.     Health Maintenance: deferred to PCP     Review of Systems   Constitutional:  Negative for chills and fever.   Cardiovascular:  Negative for chest pain.     Objective:     Exam:  /64 (BP Location: Left arm, Patient Position: Sitting, BP Cuff Size: Adult)   Pulse 100   Temp 36.1 °C (97 °F) (Temporal)   Ht 1.753 m (5' 9\")   Wt 97.3 kg " (214 lb 8.1 oz)   SpO2 97%   BMI 31.68 kg/m²  Body mass index is 31.68 kg/m².    Physical Exam  Constitutional:       General: He is not in acute distress.     Appearance: He is not toxic-appearing.   Musculoskeletal:      Right lower leg: Swelling and tenderness present. 2+ Pitting Edema present.      Left lower leg: Normal.   Neurological:      Mental Status: He is alert.     Assessment & Plan:   Enmanuel  is a pleasant 63 y.o. male with the following -     Problem List Items Addressed This Visit       Factor 5 Leiden mutation, heterozygous (HCC)     Currently anticoagulated with Eliquis, following up with anticoagulation clinic.         Hx of total knee arthroplasty, right    Lower extremity edema     Acute onset right lower extremity edema, started 3 days ago in the settings of recent immobilization due to TKA on 3/13/2023.  Uncertain etiology and prognosis at this time.  Suspect acute DVT, failed anticoagulation therapy, SSTI/    -Refer to ED for urgent evaluation   - May require parenteral anticoagulation   - If no DVT, evaluation for cellulitis.    - Patient and his wife verbalized understanding and agree with a plan.          Return for with PCP .    Please note that this dictation was created using voice recognition software. I have made every reasonable attempt to correct obvious errors, but I expect that there are errors of grammar and possibly content that I did not discover before finalizing the note.

## 2023-03-21 NOTE — ED TRIAGE NOTES
"Chief Complaint   Patient presents with    Post-Op Complications     Pt c/o tightness and swelling in Right ankle, s/p Right Total Knee one week ago    Other     Pt started taking Eliquis day of surgery and has been taking daily dose including dose this am     BP (!) 143/96   Pulse (!) 106   Temp 36.4 °C (97.5 °F) (Temporal)   Resp 20   Ht 1.753 m (5' 9\")   Wt 96 kg (211 lb 10.3 oz)   SpO2 97%   BMI 31.25 kg/m²     "

## 2023-03-21 NOTE — ED PROVIDER NOTES
ED Provider Note    CHIEF COMPLAINT  Chief Complaint   Patient presents with    Post-Op Complications     Pt c/o tightness and swelling in Right ankle, s/p Right Total Knee one week ago    Other     Pt started taking Eliquis day of surgery and has been taking daily dose including dose this am       EXTERNAL RECORDS REVIEWED  Outpatient Notes -patient underwent a right total knee arthroplasty 3/13/2023 for osteoarthritis of the associated knee by Dr. Nunes.  It appears that he was seen by his primary care physician earlier today who sent him to the ER to rule out DVT/PE.    HPI/ROS  LIMITATION TO HISTORY   Select: : None  OUTSIDE HISTORIAN(S):  Family -sister at bedside reports that the patient is having difficulty ambulating because of the pain in his leg.    Enmanuel Zhou is a 63 y.o. male with history of factor V Leiden and DVT on Eliquis who presents with cute onset right lower extremity swelling and pain that has been worsening since a total knee arthroplasty performed 3/13/2023.  He reports the pain is mostly in his right ankle.  The leg is diffusely swollen but he notes that he was told by his orthopedic surgeon that this was to be expected.  He is having difficulty being active because of the pain and has not been elevating his leg is much as he was instructed to because he is in discomfort despite his oxycodone.  He was seen by his primary care physician earlier today for the pain and swelling and was sent to the ER for evaluation and to rule out DVT.  Patient notes that he has had multiple DVTs in the past and states this feels similar.  He was off of his Eliquis for 36 hours prior to the surgery and then restarted his Eliquis the day of the surgery.  Since that time he has been completely compliant.  He denies any fevers, chest pain, shortness of breath, hemoptysis.    PAST MEDICAL HISTORY   has a past medical history of Factor 5 Leiden mutation, heterozygous (HCC).    SURGICAL HISTORY   has a past  "surgical history that includes cholo by laparoscopy (11/12/08); other abdominal surgery; and total knee arthroplasty (Right, 3/13/2023).    FAMILY HISTORY  No family history on file.    SOCIAL HISTORY  Social History     Tobacco Use    Smoking status: Never    Smokeless tobacco: Never   Vaping Use    Vaping Use: Never used   Substance and Sexual Activity    Alcohol use: Yes     Alcohol/week: 4.2 oz     Types: 7 Glasses of wine per week     Comment: glass of wine a day    Drug use: No    Sexual activity: Not Currently       CURRENT MEDICATIONS  Home Medications    **Home medications have not yet been reviewed for this encounter**         ALLERGIES  No Known Allergies    PHYSICAL EXAM  VITAL SIGNS: BP (!) 143/96   Pulse (!) 106   Temp 36.4 °C (97.5 °F) (Temporal)   Resp 20   Ht 1.753 m (5' 9\")   Wt 96 kg (211 lb 10.3 oz)   SpO2 97%   BMI 31.25 kg/m²    Physical Exam  Vitals and nursing note reviewed.   Constitutional:       Appearance: Normal appearance.   HENT:      Head: Normocephalic and atraumatic.      Right Ear: External ear normal.      Left Ear: External ear normal.      Nose: Nose normal.      Mouth/Throat:      Mouth: Mucous membranes are moist.   Eyes:      Extraocular Movements: Extraocular movements intact.      Conjunctiva/sclera: Conjunctivae normal.      Pupils: Pupils are equal, round, and reactive to light.   Cardiovascular:      Rate and Rhythm: Regular rhythm. Tachycardia present.      Pulses: Normal pulses.      Comments: Right foot is warm and well-perfused with brisk capillary refill  Pulmonary:      Effort: Pulmonary effort is normal.      Breath sounds: Normal breath sounds.   Musculoskeletal:      Cervical back: Normal range of motion and neck supple.      Comments: Right lower extremity edema with some ecchymosis over the medial aspect of the right thigh.  Bandages in place over knee and is clean, dry, and intact.  There is no significant erythema.  There is tenderness and pitting " edema worst at the medial aspect of the right ankle.   Skin:     General: Skin is warm and dry.      Capillary Refill: Capillary refill takes less than 2 seconds.   Neurological:      Mental Status: He is alert and oriented to person, place, and time.   Psychiatric:         Mood and Affect: Mood normal.         Behavior: Behavior normal.     DIAGNOSTIC STUDIES / PROCEDURES  LABS  Results for orders placed or performed during the hospital encounter of 03/21/23   CBC With Differential   Result Value Ref Range    WBC 10.7 4.8 - 10.8 K/uL    RBC 4.87 4.70 - 6.10 M/uL    Hemoglobin 14.5 14.0 - 18.0 g/dL    Hematocrit 43.3 42.0 - 52.0 %    MCV 88.9 81.4 - 97.8 fL    MCH 29.8 27.0 - 33.0 pg    MCHC 33.5 (L) 33.7 - 35.3 g/dL    RDW 41.8 35.9 - 50.0 fL    Platelet Count 324 164 - 446 K/uL    MPV 9.7 9.0 - 12.9 fL    Neutrophils-Polys 74.70 (H) 44.00 - 72.00 %    Lymphocytes 13.30 (L) 22.00 - 41.00 %    Monocytes 7.60 0.00 - 13.40 %    Eosinophils 3.50 0.00 - 6.90 %    Basophils 0.40 0.00 - 1.80 %    Immature Granulocytes 0.50 0.00 - 0.90 %    Nucleated RBC 0.00 /100 WBC    Neutrophils (Absolute) 8.02 (H) 1.82 - 7.42 K/uL    Lymphs (Absolute) 1.42 1.00 - 4.80 K/uL    Monos (Absolute) 0.81 0.00 - 0.85 K/uL    Eos (Absolute) 0.37 0.00 - 0.51 K/uL    Baso (Absolute) 0.04 0.00 - 0.12 K/uL    Immature Granulocytes (abs) 0.05 0.00 - 0.11 K/uL    NRBC (Absolute) 0.00 K/uL   Comp Metabolic Panel   Result Value Ref Range    Sodium 135 135 - 145 mmol/L    Potassium 3.7 3.6 - 5.5 mmol/L    Chloride 99 96 - 112 mmol/L    Co2 26 20 - 33 mmol/L    Anion Gap 10.0 7.0 - 16.0    Glucose 130 (H) 65 - 99 mg/dL    Bun 24 (H) 8 - 22 mg/dL    Creatinine 0.84 0.50 - 1.40 mg/dL    Calcium 9.1 8.4 - 10.2 mg/dL    AST(SGOT) 16 12 - 45 U/L    ALT(SGPT) 18 2 - 50 U/L    Alkaline Phosphatase 68 30 - 99 U/L    Total Bilirubin 1.4 0.1 - 1.5 mg/dL    Albumin 3.6 3.2 - 4.9 g/dL    Total Protein 7.2 6.0 - 8.2 g/dL    Globulin 3.6 (H) 1.9 - 3.5 g/dL    A-G  Ratio 1.0 g/dL   Troponin   Result Value Ref Range    Troponin T 8 6 - 19 ng/L   proBrain Natriuretic Peptide, NT   Result Value Ref Range    NT-proBNP 18 0 - 125 pg/mL   Prothrombin Time   Result Value Ref Range    PT 14.4 12.0 - 14.6 sec    INR 1.14 (H) 0.87 - 1.13   APTT   Result Value Ref Range    APTT 33.2 24.7 - 36.0 sec   CORRECTED CALCIUM   Result Value Ref Range    Correct Calcium 9.4 8.5 - 10.5 mg/dL   ESTIMATED GFR   Result Value Ref Range    GFR (CKD-EPI) 97 >60 mL/min/1.73 m 2   EKG   Result Value Ref Range    Report       [facility] Emergency Dept.    Test Date:  2023  Pt Name:    IVET BARBOSA               Department: [facility]  MRN:        [id number]                      Room:  Gender:     Male                         Technician: [id number]  :        1960                   Requested By:YESENIA ROWLAND  Order #:    [id number]                    Reading MD: Yesenia Rowland MD    Measurements  Intervals                                Axis  Rate:       93                           P:          20  PA:         163                          QRS:        64  QRSD:       95                           T:          0  QT:         378  QTc:        471    Interpretive Statements  Sinus rhythm  Baseline wander in lead(s) V2  Compared to ECG 2008 13:09:43  No significant changes  Electronically Signed On 3- 8:02:23 PDT by Yesenia Rowland MD       RADIOLOGY  I have independently interpreted the diagnostic imaging associated with this visit and am waiting the final reading from the radiologist.   My preliminary interpretation is as follows: No acute cardiopulmonary abnormality.    Radiologist interpretation:   US-EXTREMITY VENOUS LOWER BILAT   Final Result      CT-CTA CHEST PULMONARY ARTERY W/ RECONS   Final Result      1.  No evidence of pulmonary embolus.      2.  3 mm pulmonary nodule within the right middle lobe.      3.  Calcified granulomas.      4.  Ectasia of the ascending  thoracic aorta measured at 4.1 cm in diameter.      5.  Multiple hepatic cysts.      DX-CHEST-PORTABLE (1 VIEW)   Final Result      No evidence of acute cardiopulmonary process.        COURSE & MEDICAL DECISION MAKING    ED Observation Status? Yes; I am placing the patient in to an observation status due to a diagnostic uncertainty as well as therapeutic intensity. Patient placed in observation status at 7:53 AM, 3/21/2023.     Observation plan is as follows: Labs, imaging, reevaluation    Upon Reevaluation, the patient's condition has: Improved; and will be discharged.    Patient discharged from ED Observation status at 12:42 PM (Time) 3/21/2023 (Date).     INITIAL ASSESSMENT, COURSE AND PLAN  Care Narrative: This is a 63 year old male with a history of DVT on Eliquis s/p right TKA who was sent here with right leg swelling and pain concerning for DVT and failure of anticoagulation. He initially arrived tachycardic but this resolved prior to my physical exam without intervention. He denies any chest pain, shortness of breath, hemoptysis, fevers. He does have pitting edema in the RLE extending from the knee to the foot but the RLE is warm and well perfused. There is no obvious erythema, warmth, or fluctuance.     DVT ultrasound ordered as well as CTA chest given the tachycardia in order to rule out PE.    CBC is without leukocytosis or anemia. Metabolic panel demonstrates normal electrolytes, renal, and liver function. Glucose is slightly elevated to 130.    DVT ultrasound does not reveal thrombus. CTA chest thankfully without PE.    I paged Dr. Nunes to discuss the patient's presentation. Without fevers, leukocytosis, erythema, I have a low suspicion for infection. While waiting for orthopedic surgery to call back I reevaluated the patient. He is resting comfortably. He does not wish to wait for ortho to return my phone call and has agreed to call Dr. Nunes's office today to schedule close follow up. VS remain  normal and stable. He is safe for d/c with close outpatient management. Discharged in good and stable condition with strict return precautions.    ADDITIONAL PROBLEM LIST  None    DISPOSITION AND DISCUSSIONS  I have discussed management of the patient with the following physicians and MICHAEL's:  Dr. Nunes was paged but did not return the page. Dr. Calle ultimately returned the page a little two hours after the patient was discharged.    Discussion of management with other \[facility]\"" or appropriate source(s): None     Escalation of care considered, and ultimately not performed:acute inpatient care management, however at this time, the patient is most appropriate for outpatient management    Barriers to care at this time, including but not limited to:  None .     Decision tools and prescription drugs considered including, but not limited to:  None .    FINAL DIAGNOSIS  1. Right ankle pain, unspecified chronicity    2. Leg swelling      Electronically signed by: Jairo Rowland M.D., 3/21/2023 7:31 AM

## 2023-03-21 NOTE — ASSESSMENT & PLAN NOTE
Acute onset right lower extremity edema, started 3 days ago in the settings of recent immobilization due to TKA on 3/13/2023.  Uncertain etiology and prognosis at this time.  Suspect acute DVT, failed anticoagulation therapy, SSTI/    -Refer to ED for urgent evaluation   - May require parenteral anticoagulation   - If no DVT, evaluation for cellulitis.    - Patient and his wife verbalized understanding and agree with a plan.

## 2023-03-21 NOTE — ED NOTES
Pt given d/c paperwork and f/u instruction; pt verbalized understanding all information given. Pt ambulated out of the ER w/o difficulty using FWW

## 2023-04-04 ENCOUNTER — OFFICE VISIT (OUTPATIENT)
Dept: MEDICAL GROUP | Age: 63
End: 2023-04-04
Payer: MEDICARE

## 2023-04-04 VITALS
BODY MASS INDEX: 30.36 KG/M2 | WEIGHT: 205 LBS | SYSTOLIC BLOOD PRESSURE: 114 MMHG | HEIGHT: 69 IN | DIASTOLIC BLOOD PRESSURE: 62 MMHG | OXYGEN SATURATION: 96 % | HEART RATE: 72 BPM | TEMPERATURE: 97.1 F

## 2023-04-04 DIAGNOSIS — D68.51 FACTOR 5 LEIDEN MUTATION, HETEROZYGOUS (HCC): ICD-10-CM

## 2023-04-04 DIAGNOSIS — M25.511 CHRONIC RIGHT SHOULDER PAIN: ICD-10-CM

## 2023-04-04 DIAGNOSIS — Z86.73 HISTORY OF CVA (CEREBROVASCULAR ACCIDENT): ICD-10-CM

## 2023-04-04 DIAGNOSIS — Z87.828 HISTORY OF LACERATION OF SKIN: ICD-10-CM

## 2023-04-04 DIAGNOSIS — G89.29 CHRONIC RIGHT SHOULDER PAIN: ICD-10-CM

## 2023-04-04 PROCEDURE — 90471 IMMUNIZATION ADMIN: CPT | Performed by: FAMILY MEDICINE

## 2023-04-04 PROCEDURE — 20610 DRAIN/INJ JOINT/BURSA W/O US: CPT | Mod: RT | Performed by: FAMILY MEDICINE

## 2023-04-04 PROCEDURE — 90715 TDAP VACCINE 7 YRS/> IM: CPT | Performed by: FAMILY MEDICINE

## 2023-04-04 PROCEDURE — 99214 OFFICE O/P EST MOD 30 MIN: CPT | Mod: 25 | Performed by: FAMILY MEDICINE

## 2023-04-04 RX ORDER — CLOBETASOL PROPIONATE 0.5 MG/G
1 CREAM TOPICAL 2 TIMES DAILY PRN
Qty: 30 G | Refills: 2 | Status: SHIPPED | OUTPATIENT
Start: 2023-04-04

## 2023-04-04 RX ORDER — TRIAMCINOLONE ACETONIDE 40 MG/ML
80 INJECTION, SUSPENSION INTRA-ARTICULAR; INTRAMUSCULAR ONCE
Status: COMPLETED | OUTPATIENT
Start: 2023-04-04 | End: 2023-04-04

## 2023-04-04 RX ADMIN — TRIAMCINOLONE ACETONIDE 80 MG: 40 INJECTION, SUSPENSION INTRA-ARTICULAR; INTRAMUSCULAR at 11:07

## 2023-04-04 ASSESSMENT — FIBROSIS 4 INDEX: FIB4 SCORE: 0.76

## 2023-04-04 NOTE — PROGRESS NOTES
This medical record contains text that has been entered with the assistance of computer voice recognition and dictation software.  Therefore, it may contain unintended errors in text, spelling, punctuation, or grammar      Chief Complaint   Patient presents with    Follow-Up     MEDICATION REFILL  ]    Shoulder Pain     Achy right shoulder for about a year. Requesting a cortisone shot.          Enmanuel Zhou is a 63 y.o. male here evaluation and management of: shoulder pain      HPI:           1. 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  He states that he has been getting some shoulder pain for the past years, aches and sharp pain its in the front of the shoulder and the back of the shoulder.  He would like to proceed with a steroid injection today.    2. History of laceration of skin  He has a laceration on his right leg and he is overdue for Tdap.    3. Factor 5 Leiden mutation, heterozygous (HCC)     Enmanuel also has a history of DVT and is positive for factor V Leyden mutation heterozygous and he is on Eliquis 5 mg p.o. twice daily.  States that he cannot see his cardiologist for a while and needs me to refill the medication.  Overall he is doing well on it.  He denies any bleeding episodes    Current medicines (including changes today)  Current Outpatient Medications   Medication Sig Dispense Refill    apixaban (ELIQUIS) 5mg Tab Take 1 Tablet by mouth 2 times a day. 120 Tablet 0    clobetasol (TEMOVATE) 0.05 % Cream Apply 1 Application. topically 2 times a day as needed (Apply's to eczema spots). 30 g 2    acetaminophen (TYLENOL) 500 MG Tab Take 1,000 mg by mouth every 6 hours as needed. Indications: Pain      losartan-hydrochlorothiazide (HYZAAR) 100-25 MG per tablet Take 1 Tablet by mouth every day. 90 Tablet 2    amLODIPine (NORVASC) 5 MG Tab Take 1 Tablet by  "mouth every day. 90 Tablet 3     No current facility-administered medications for this visit.     He  has a past medical history of Factor 5 Leiden mutation, heterozygous (HCC).  He  has a past surgical history that includes cholo by laparoscopy (11/12/08); other abdominal surgery; and pr total knee arthroplasty (Right, 3/13/2023).  Social History     Tobacco Use    Smoking status: Never    Smokeless tobacco: Never   Vaping Use    Vaping Use: Never used   Substance Use Topics    Alcohol use: Yes     Alcohol/week: 4.2 oz     Types: 7 Glasses of wine per week    Drug use: No     Social History     Social History Narrative    Not on file     History reviewed. No pertinent family history.  No family status information on file.         ROS    The pertinent  ROS findings can be seen in the HPI above.     All other systems reviewed and are negative     Objective:     /62 (BP Location: Right arm, Patient Position: Sitting, BP Cuff Size: Adult)   Pulse 72   Temp 36.2 °C (97.1 °F) (Temporal)   Ht 1.753 m (5' 9\")   Wt 93 kg (205 lb)   SpO2 96%  Body mass index is 30.27 kg/m².      Physical Exam:    Constitutional: Alert, no distress.  Skin: 3 inch superficial laceration on right thigh  Eye: Equal, round and reactive, conjunctiva clear, lids normal.  ENMT: Lips without lesions, good dentition, oropharynx clear.  Neck: Trachea midline, no masses, no thyromegaly. No cervical or supraclavicular lymphadenopathy.  Respiratory: Unlabored respiratory effort, lungs clear to auscultation, no wheezes, no ronchi.  Cardiovascular: Normal S1, S2, no murmur, no edema  Abdomen: Soft, non-tender, no masses, no hepatosplenomegaly.      PROCEDURE: STEROID INJECTION (SHOULDER-ANTERIOR APPROACH) Procedure was explained to patient with possible risks. Patient verbally agreed. The shoulder was prepped with iodine. Using a 23 guage needle and  the shoulder externally rotated, the needle was inserted at a point just medial to the head of " the humerus and slightly inferior and lateral to the coracoid process. A mix containing 2cc of Kenalog (40mg) and 1cc of 2% Lidocaine without epi was injected. There were no adverse events, and patient immediately noted pain relief.       Assessment and Plan:   The following treatment plan was discussed    All recent labs and provider notes reviewed    1. Chronic right shoulder pain      Patient tolerated procedure well  There were no adverse events  Patient was given post procedure precautions     2. History of laceration of skin  - Tdap =>6yo IM    3. History of CVA (cerebrovascular accident)  - apixaban (ELIQUIS) 5mg Tab; Take 1 Tablet by mouth 2 times a day.  Dispense: 120 Tablet; Refill: 0    4. Factor 5 Leiden mutation, heterozygous (HCC)  - apixaban (ELIQUIS) 5mg Tab; Take 1 Tablet by mouth 2 times a day.  Dispense: 120 Tablet; Refill: 0    Other orders  - clobetasol (TEMOVATE) 0.05 % Cream; Apply 1 Application. topically 2 times a day as needed (Apply's to eczema spots).  Dispense: 30 g; Refill: 2             Instructed to Follow up in clinic or ER for worsening symptoms, difficulty breathing, lack of expected recovery, or should new symptoms or problems arise.    Followup: Return in about 6 months (around 10/4/2023) for Reevaluation, labs.

## 2023-04-05 ENCOUNTER — TELEPHONE (OUTPATIENT)
Dept: VASCULAR LAB | Facility: MEDICAL CENTER | Age: 63
End: 2023-04-05
Payer: MEDICARE

## 2023-04-05 NOTE — TELEPHONE ENCOUNTER
Received call from pt asking for another copay card for Eliquis because he lost it. Pt has used it w/ [facility]. Advised pt that if he already used it w/ [facility], they should still have it in their system and that the copay card may have terms restricting him from using another card. He will check w/ [facility].    Rin Gallo, SatnamD

## 2023-06-13 ENCOUNTER — ANTICOAGULATION VISIT (OUTPATIENT)
Dept: VASCULAR LAB | Facility: MEDICAL CENTER | Age: 63
End: 2023-06-13
Attending: INTERNAL MEDICINE
Payer: MEDICARE

## 2023-06-13 DIAGNOSIS — I82.409 DEEP VEIN THROMBOSIS (DVT) OF LOWER EXTREMITY, UNSPECIFIED CHRONICITY, UNSPECIFIED LATERALITY, UNSPECIFIED VEIN (HCC): ICD-10-CM

## 2023-06-13 DIAGNOSIS — Z86.73 HISTORY OF CVA (CEREBROVASCULAR ACCIDENT): ICD-10-CM

## 2023-06-13 DIAGNOSIS — D68.51 FACTOR 5 LEIDEN MUTATION, HETEROZYGOUS (HCC): ICD-10-CM

## 2023-06-13 PROCEDURE — 99212 OFFICE O/P EST SF 10 MIN: CPT

## 2023-06-13 NOTE — PROGRESS NOTES
Target end date:Indefinite  Indication: Recurrent VTE, hx of CVA and Factor V Leiden  Drug: Eliquis 5mg bid  CHADsVASC = n/a  HAS-BLED = 1 (hx of CVA)    Health Status Since Last Assessment  Patient denies any new relevant medical problems, ED visits or hospitalizations  Patient denies any embolic events (stroke/tia/systemic embolism)    Pt was in the ER in March for RLE swelling s/p TKA the week prior. US was negative for VTE.    Adherence with DOAC Therapy  Pt has rarely missed any doses in the average week - once in a while will miss PM dose    Bleeding Risk Assessment  Denies Epistaxis  Pt denies any excessive or unusual bleeding/hematomas.  Pt denies any GI bleeds or hematemesis.  Pt denies any concerning daily headache or sub dural hematoma symptoms.    Pt denies any hematuria   Latest Hemoglobin wnl 03/21/23  Platelets: wnl 03/21/23  ETOH overuse - occasional - he limits 2 drinks/day     Creatinine Clearance/Renal Function  Latest CrCl > 30ml/min    Hepatic function  Latest LFTs wnl  Pt denies any history of liver dysfunction      Drug Interactions  Medications reconciled   ASA/other antiplatelets no  NSAID no  Other drug interactions no   Verified no anticonvulsant or azole therapy, education provided for future use.     Examination  Blood Pressure unable to take today  There were no vitals filed for this visit.  Symptomatic hypotension no  Significant gait impairment/imbalance/high risk for falls? no    Final Assessment and Recommendations:  Patient appears stable from the anticoagulation standpoint.    Patient is able to afford DOAC    Benefits of continued DOAC therapy outweigh risks for this patient  Recommend pt continue with current DOAC therapy      Other Actions: cmp/ cbc hemogram ordered prior to next visit    Follow up:  Will follow up with patient 6  months.      Rin Gallo, PharmD

## 2023-06-20 ENCOUNTER — HOSPITAL ENCOUNTER (OUTPATIENT)
Dept: LAB | Facility: MEDICAL CENTER | Age: 63
End: 2023-06-20
Attending: FAMILY MEDICINE
Payer: MEDICARE

## 2023-06-20 DIAGNOSIS — Z86.73 HISTORY OF CVA (CEREBROVASCULAR ACCIDENT): ICD-10-CM

## 2023-06-20 DIAGNOSIS — D68.51 FACTOR 5 LEIDEN MUTATION, HETEROZYGOUS (HCC): ICD-10-CM

## 2023-06-20 DIAGNOSIS — I82.409 DEEP VEIN THROMBOSIS (DVT) OF LOWER EXTREMITY, UNSPECIFIED CHRONICITY, UNSPECIFIED LATERALITY, UNSPECIFIED VEIN (HCC): ICD-10-CM

## 2023-06-20 LAB
ALBUMIN SERPL BCP-MCNC: 4.5 G/DL (ref 3.2–4.9)
ALBUMIN/GLOB SERPL: 1.7 G/DL
ALP SERPL-CCNC: 77 U/L (ref 30–99)
ALT SERPL-CCNC: 17 U/L (ref 2–50)
ANION GAP SERPL CALC-SCNC: 12 MMOL/L (ref 7–16)
AST SERPL-CCNC: 15 U/L (ref 12–45)
BILIRUB SERPL-MCNC: 0.9 MG/DL (ref 0.1–1.5)
BUN SERPL-MCNC: 19 MG/DL (ref 8–22)
CALCIUM ALBUM COR SERPL-MCNC: 9.1 MG/DL (ref 8.5–10.5)
CALCIUM SERPL-MCNC: 9.5 MG/DL (ref 8.5–10.5)
CHLORIDE SERPL-SCNC: 104 MMOL/L (ref 96–112)
CO2 SERPL-SCNC: 27 MMOL/L (ref 20–33)
CREAT SERPL-MCNC: 0.89 MG/DL (ref 0.5–1.4)
ERYTHROCYTE [DISTWIDTH] IN BLOOD BY AUTOMATED COUNT: 48.6 FL (ref 35.9–50)
FASTING STATUS PATIENT QL REPORTED: NORMAL
GFR SERPLBLD CREATININE-BSD FMLA CKD-EPI: 95 ML/MIN/1.73 M 2
GLOBULIN SER CALC-MCNC: 2.7 G/DL (ref 1.9–3.5)
GLUCOSE SERPL-MCNC: 100 MG/DL (ref 65–99)
HCT VFR BLD AUTO: 48.1 % (ref 42–52)
HGB BLD-MCNC: 16.1 G/DL (ref 14–18)
MCH RBC QN AUTO: 30.5 PG (ref 27–33)
MCHC RBC AUTO-ENTMCNC: 33.5 G/DL (ref 32.3–36.5)
MCV RBC AUTO: 91.1 FL (ref 81.4–97.8)
PLATELET # BLD AUTO: 233 K/UL (ref 164–446)
PMV BLD AUTO: 11.4 FL (ref 9–12.9)
POTASSIUM SERPL-SCNC: 3.9 MMOL/L (ref 3.6–5.5)
PROT SERPL-MCNC: 7.2 G/DL (ref 6–8.2)
RBC # BLD AUTO: 5.28 M/UL (ref 4.7–6.1)
SODIUM SERPL-SCNC: 143 MMOL/L (ref 135–145)
WBC # BLD AUTO: 6.8 K/UL (ref 4.8–10.8)

## 2023-06-20 PROCEDURE — 85027 COMPLETE CBC AUTOMATED: CPT

## 2023-06-20 PROCEDURE — 80053 COMPREHEN METABOLIC PANEL: CPT

## 2023-06-20 PROCEDURE — 36415 COLL VENOUS BLD VENIPUNCTURE: CPT

## 2023-07-04 DIAGNOSIS — I10 ESSENTIAL HYPERTENSION: ICD-10-CM

## 2023-07-05 RX ORDER — LOSARTAN POTASSIUM AND HYDROCHLOROTHIAZIDE 25; 100 MG/1; MG/1
TABLET ORAL
Qty: 90 TABLET | Refills: 0 | Status: SHIPPED | OUTPATIENT
Start: 2023-07-05 | End: 2023-09-27

## 2023-09-06 ENCOUNTER — OFFICE VISIT (OUTPATIENT)
Dept: MEDICAL GROUP | Age: 63
End: 2023-09-06
Payer: MEDICARE

## 2023-09-06 VITALS
WEIGHT: 216 LBS | HEIGHT: 69 IN | DIASTOLIC BLOOD PRESSURE: 68 MMHG | BODY MASS INDEX: 31.99 KG/M2 | OXYGEN SATURATION: 100 % | SYSTOLIC BLOOD PRESSURE: 106 MMHG | HEART RATE: 64 BPM | TEMPERATURE: 97 F

## 2023-09-06 DIAGNOSIS — G56.01 RIGHT CARPAL TUNNEL SYNDROME: ICD-10-CM

## 2023-09-06 PROCEDURE — 3078F DIAST BP <80 MM HG: CPT | Performed by: FAMILY MEDICINE

## 2023-09-06 PROCEDURE — 3074F SYST BP LT 130 MM HG: CPT | Performed by: FAMILY MEDICINE

## 2023-09-06 PROCEDURE — 99214 OFFICE O/P EST MOD 30 MIN: CPT | Performed by: FAMILY MEDICINE

## 2023-09-06 RX ORDER — NAPROXEN 500 MG/1
500 TABLET ORAL 2 TIMES DAILY WITH MEALS
Qty: 60 TABLET | Refills: 0 | Status: SHIPPED | OUTPATIENT
Start: 2023-09-06 | End: 2023-09-06

## 2023-09-06 ASSESSMENT — FIBROSIS 4 INDEX: FIB4 SCORE: 1.01

## 2023-09-06 NOTE — PROGRESS NOTES
This medical record contains text that has been entered with the assistance of computer voice recognition and dictation software.  Therefore, it may contain unintended errors in text, spelling, punctuation, or grammar      Chief Complaint   Patient presents with    Wrist Pain     Right wrist pain x 1 year          Enmanuel Zhou is a 63 y.o. male here evaluation and management of: Right wrist pain x1 year      HPI:           1.  Right wrist pain  NEW UNDIAGNOSED PROBLEM    Enmanuel is a very pleasant 63-year-old male who presents to clinic with chief plaint of right wrist pain over 1 year.  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  He states his wrist hurts usually all the time probably worse in the morning than at night.      Current medicines (including changes today)  Current Outpatient Medications   Medication Sig Dispense Refill    losartan-hydrochlorothiazide (HYZAAR) 100-25 MG per tablet Take 1 tablet by mouth once daily 90 Tablet 0    apixaban (ELIQUIS) 5mg Tab Take 1 Tablet by mouth 2 times a day. 180 Tablet 1    clobetasol (TEMOVATE) 0.05 % Cream Apply 1 Application. topically 2 times a day as needed (Apply's to eczema spots). 30 g 2    acetaminophen (TYLENOL) 500 MG Tab Take 1,000 mg by mouth every 6 hours as needed. Indications: Pain      amLODIPine (NORVASC) 5 MG Tab Take 1 Tablet by mouth every day. 90 Tablet 3     No current facility-administered medications for this visit.     He  has a past medical history of Factor 5 Leiden mutation, heterozygous (HCC).  He  has a past surgical history that includes cholo by laparoscopy (11/12/08); other abdominal surgery; and pr total knee arthroplasty (Right, 3/13/2023).  Social History     Tobacco Use    Smoking status: Never    Smokeless tobacco: Never   Vaping Use    Vaping Use: Never used   Substance Use Topics    Alcohol use: Yes     Alcohol/week: 4.2 oz     Types: 7 Glasses of wine per week    Drug use: No  "    Social History     Social History Narrative    Not on file     No family history on file.  No family status information on file.         ROS    The pertinent  ROS findings can be seen in the HPI above.     All other systems reviewed and are negative     Objective:     /68 (BP Location: Left arm, Patient Position: Sitting, BP Cuff Size: Adult)   Pulse 64   Temp 36.1 °C (97 °F) (Temporal)   Ht 1.753 m (5' 9\")   Wt 98 kg (216 lb)   SpO2 100%  Body mass index is 31.9 kg/m².      Physical Exam:    Constitutional: Alert, no distress.  Skin: No suspicious lesions  Eye: Equal, round and reactive, conjunctiva clear, lids normal.  ENMT: Lips without lesions, good dentition, oropharynx clear.  Neck: Trachea midline, no masses, no thyromegaly. No cervical or supraclavicular lymphadenopathy.  Respiratory: Unlabored respiratory effort, lungs clear to auscultation, no wheezes, no ronchi.  Cardiovascular: Normal S1, S2, no murmur, no edema  Abdomen: Soft, non-tender, no masses, no hepatosplenomegaly.  Right wrist--no visual abnormalities, negative Tinel, severe pain with Phalen, negative Finkelstein test      Assessment and Plan:   The following treatment plan was discussed    All recent labs and provider notes reviewed    1. Right carpal tunnel syndrome    We reviewed online wrist splints that he can obtain for carpal tunnel syndrome  He is to use the splints at night for the next 2 weeks while asleep  Also use it while lifting objects  Tylenol for pain as he is on Eliquis  Ice 15 minutes on 3 times daily    - Referral to Physical Therapy             Instructed to Follow up in clinic or ER for worsening symptoms, difficulty breathing, lack of expected recovery, or should new symptoms or problems arise.    Followup: Return in about 6 months (around 3/6/2024) for Reevaluation, labs.               "

## 2023-09-15 ENCOUNTER — TELEPHONE (OUTPATIENT)
Dept: HEALTH INFORMATION MANAGEMENT | Facility: OTHER | Age: 63
End: 2023-09-15
Payer: MEDICARE

## 2023-09-27 DIAGNOSIS — I10 ESSENTIAL HYPERTENSION: ICD-10-CM

## 2023-09-27 RX ORDER — LOSARTAN POTASSIUM AND HYDROCHLOROTHIAZIDE 25; 100 MG/1; MG/1
TABLET ORAL
Qty: 90 TABLET | Refills: 0 | Status: SHIPPED | OUTPATIENT
Start: 2023-09-27 | End: 2024-01-19

## 2023-10-04 DIAGNOSIS — I10 ESSENTIAL HYPERTENSION: ICD-10-CM

## 2023-10-04 RX ORDER — AMLODIPINE BESYLATE 5 MG/1
5 TABLET ORAL DAILY
Qty: 90 TABLET | Refills: 0 | Status: SHIPPED | OUTPATIENT
Start: 2023-10-04 | End: 2024-01-19

## 2023-11-22 ENCOUNTER — OFFICE VISIT (OUTPATIENT)
Dept: MEDICAL GROUP | Age: 63
End: 2023-11-22
Payer: MEDICARE

## 2023-11-22 VITALS
HEART RATE: 60 BPM | TEMPERATURE: 97.1 F | SYSTOLIC BLOOD PRESSURE: 122 MMHG | WEIGHT: 213 LBS | DIASTOLIC BLOOD PRESSURE: 74 MMHG | OXYGEN SATURATION: 97 % | BODY MASS INDEX: 31.55 KG/M2 | HEIGHT: 69 IN

## 2023-11-22 DIAGNOSIS — N40.0 BENIGN PROSTATIC HYPERPLASIA WITHOUT LOWER URINARY TRACT SYMPTOMS: ICD-10-CM

## 2023-11-22 DIAGNOSIS — I10 ESSENTIAL HYPERTENSION: ICD-10-CM

## 2023-11-22 DIAGNOSIS — D22.9 NUMEROUS MOLES: ICD-10-CM

## 2023-11-22 DIAGNOSIS — M25.511 CHRONIC RIGHT SHOULDER PAIN: ICD-10-CM

## 2023-11-22 DIAGNOSIS — G56.01 RIGHT CARPAL TUNNEL SYNDROME: ICD-10-CM

## 2023-11-22 DIAGNOSIS — E78.00 PURE HYPERCHOLESTEROLEMIA: ICD-10-CM

## 2023-11-22 DIAGNOSIS — D68.51 FACTOR 5 LEIDEN MUTATION, HETEROZYGOUS (HCC): ICD-10-CM

## 2023-11-22 DIAGNOSIS — Z00.00 MEDICARE ANNUAL WELLNESS VISIT, INITIAL: ICD-10-CM

## 2023-11-22 DIAGNOSIS — Z86.73 HISTORY OF CVA (CEREBROVASCULAR ACCIDENT): ICD-10-CM

## 2023-11-22 DIAGNOSIS — Z11.59 NEED FOR HEPATITIS C SCREENING TEST: ICD-10-CM

## 2023-11-22 DIAGNOSIS — G89.29 CHRONIC RIGHT SHOULDER PAIN: ICD-10-CM

## 2023-11-22 PROCEDURE — 3074F SYST BP LT 130 MM HG: CPT | Performed by: FAMILY MEDICINE

## 2023-11-22 PROCEDURE — G0402 INITIAL PREVENTIVE EXAM: HCPCS | Performed by: FAMILY MEDICINE

## 2023-11-22 PROCEDURE — 3078F DIAST BP <80 MM HG: CPT | Performed by: FAMILY MEDICINE

## 2023-11-22 ASSESSMENT — FIBROSIS 4 INDEX: FIB4 SCORE: 1.01

## 2023-11-22 ASSESSMENT — ENCOUNTER SYMPTOMS: GENERAL WELL-BEING: GOOD

## 2023-11-22 ASSESSMENT — PATIENT HEALTH QUESTIONNAIRE - PHQ9: CLINICAL INTERPRETATION OF PHQ2 SCORE: 0

## 2023-11-22 ASSESSMENT — ACTIVITIES OF DAILY LIVING (ADL): BATHING_REQUIRES_ASSISTANCE: 0

## 2023-11-22 NOTE — PROGRESS NOTES
Chief Complaint   Patient presents with    Annual Exam       HPI:  Enmanuel Zhou is a 63 y.o. here for Medicare Annual Wellness Visit     Patient Active Problem List    Diagnosis Date Noted    Numerous moles 11/22/2023    Right carpal tunnel syndrome 09/06/2023    Chronic right shoulder pain 04/04/2023    Hx of total knee arthroplasty, right 03/21/2023    Leg cramping 10/05/2022    Dermatitis 07/26/2022    Osteoarthritis of right knee 07/22/2022    Deep vein thrombosis (HCC) 11/10/2021    Lower extremity edema 11/09/2021    Right knee pain 09/07/2021    Essential hypertension 04/07/2020    Encounter for medical examination to establish care 03/13/2020    Obesity (BMI 30-39.9) 03/13/2020    History of CVA (cerebrovascular accident) 03/13/2020    Factor 5 Leiden mutation, heterozygous (HCC) 03/13/2020    Acute pain of left shoulder 03/13/2020       Current Outpatient Medications   Medication Sig Dispense Refill    amLODIPine (NORVASC) 5 MG Tab Take 1 tablet by mouth once daily 90 Tablet 0    losartan-hydrochlorothiazide (HYZAAR) 100-25 MG per tablet Take 1 tablet by mouth once daily 90 Tablet 0    apixaban (ELIQUIS) 5mg Tab Take 1 Tablet by mouth 2 times a day. 180 Tablet 1    clobetasol (TEMOVATE) 0.05 % Cream Apply 1 Application. topically 2 times a day as needed (Apply's to eczema spots). 30 g 2    acetaminophen (TYLENOL) 500 MG Tab Take 1,000 mg by mouth every 6 hours as needed. Indications: Pain       No current facility-administered medications for this visit.          Current supplements as per medication list.     Allergies: Patient has no known allergies.    Current social contact/activities: play in a band, fishing with family, spend time with family.      He  reports that he has never smoked. He has never used smokeless tobacco. He reports current alcohol use of about 4.2 oz of alcohol per week. He reports that he does not use drugs.  Counseling given: Not Answered      ROS:    Gait: Uses no  assistive device  Ostomy: No  Other tubes: No  Amputations: No  Chronic oxygen use: No  Last eye exam: 11/18  Wears hearing aids: Yes   : Denies any urinary leakage during the last 6 months    Screening:    Depression Screening  Little interest or pleasure in doing things?  0 - not at all  Feeling down, depressed , or hopeless? 0 - not at all  Patient Health Questionnaire Score: 0     If depressive symptoms identified deferred to follow up visit unless specifically addressed in assessment and plan.    Interpretation of PHQ-9 Total Score   Score Severity   1-4 No Depression   5-9 Mild Depression   10-14 Moderate Depression   15-19 Moderately Severe Depression   20-27 Severe Depression    Screening for Cognitive Impairment  Do you or any of your friends or family members have any concern about your memory? No  Three Minute Recall (Banana, Sunrise, Chair) 2/3    Marc clock face with all 12 numbers and set the hands to show 20 past 8.  Yes    Cognitive concerns identified deferred for follow up unless specifically addressed in assessment and plan.    Fall Risk Assessment  Has the patient had two or more falls in the last year or any fall with injury in the last year?  No    Safety Assessment  Do you always wear your seatbelt?  Yes  Any changes to home needed to function safely? No  Difficulty hearing.  Yes  Patient counseled about all safety risks that were identified.    Functional Assessment ADLs  Are there any barriers preventing you from cooking for yourself or meeting nutritional needs?  No.    Are there any barriers preventing you from driving safely or obtaining transportation?  No.    Are there any barriers preventing you from using a telephone or calling for help?  No    Are there any barriers preventing you from shopping?  No.    Are there any barriers preventing you from taking care of your own finances?  No    Are there any barriers preventing you from managing your medications?  No    Are there any  barriers preventing you from showering, bathing or dressing yourself? No    Are there any barriers preventing you from doing housework or laundry? No  Are there any barriers preventing you from using the toilet?No  Are you currently engaging in any exercise or physical activity?  Yes.      Self-Assessment of Health  What is your perception of your health? Good  Do you sleep more than six hours a night? Yes  In the past 7 days, how much did pain keep you from doing your normal work? None  Do you spend quality time with family or friends (virtually or in person)? Yes  Do you usually eat a heart healthy diet that constists of a variety of fruits, vegetables, whole grains and fiber? No  Do you eat foods high in fat and/or Fast Food more than three times per week? No    Advance Care Planning  Do you have an Advance Directive, Living Will, Durable Power of , or POLST? No                 Health Maintenance Summary            Ordered - Hepatitis C Screening (Once) Ordered on 11/22/2023      No completion history exists for this topic.              Overdue - COVID-19 Vaccine (1) Overdue - never done      No completion history exists for this topic.              Overdue - Zoster (Shingles) Vaccines (1 of 2) Overdue - never done      No completion history exists for this topic.              Overdue - Influenza Vaccine (1) Overdue since 9/1/2023      10/05/2022  Imm Admin: Influenza Vaccine Quad Inj (Pf)    11/09/2021  Imm Admin: Influenza Vaccine Quad Inj (Pf)    09/15/2020  Imm Admin: Influenza, Unspecified - HISTORICAL DATA              Postponed - Pneumococcal Vaccine: 65+ Years (1 - PCV) Postponed until 11/22/2024      No completion history exists for this topic.              Colorectal Cancer Screening (Colonoscopy - Every 5 Years) Tentatively due on 10/7/2026      10/07/2021  REFERRAL TO GI FOR COLONOSCOPY    10/05/2021  REFERRAL TO GI FOR COLONOSCOPY              IMM DTaP/Tdap/Td Vaccine (2 - Td or Tdap) Next  "due on 4/4/2033 04/04/2023  Imm Admin: Tdap Vaccine              Annual Wellness Visit  Completed      11/22/2023  Visit Dx: Medicare annual wellness visit, initial    11/22/2023  Level of Service: INITIAL ANNUAL WELLNESS VISIT-INCLUDES PPPS              Hepatitis A Vaccine (Hep A) (Series Information) Aged Out      No completion history exists for this topic.              Hepatitis B Vaccine (Hep B) (Series Information) Aged Out      No completion history exists for this topic.              HPV Vaccines (Series Information) Aged Out      No completion history exists for this topic.              Polio Vaccine (Inactivated Polio) (Series Information) Aged Out      No completion history exists for this topic.              Meningococcal Immunization (Series Information) Aged Out      No completion history exists for this topic.                    Patient Care Team:  Zander Manzanares M.D. as PCP - General (Family Medicine)  [facility]        Social History     Tobacco Use    Smoking status: Never    Smokeless tobacco: Never   Vaping Use    Vaping Use: Never used   Substance Use Topics    Alcohol use: Yes     Alcohol/week: 4.2 oz     Types: 7 Glasses of wine per week    Drug use: No     No family history on file.  He  has a past medical history of Factor 5 Leiden mutation, heterozygous (HCC).   Past Surgical History:   Procedure Laterality Date    PB TOTAL KNEE ARTHROPLASTY Right 3/13/2023    Procedure: RIGHT TOTAL KNEE ARTHROPLASTY;  Surgeon: Raul Nunes M.D.;  Location: [facility];  Service: Orthopedics    MADELIN BY LAPAROSCOPY  11/12/08    Performed by YASMANI KERN at [facility]    OTHER ABDOMINAL SURGERY         Exam:   /74 (BP Location: Left arm, Patient Position: Sitting, BP Cuff Size: Adult long)   Pulse 60   Temp 36.2 °C (97.1 °F) (Temporal)   Ht 1.753 m (5' 9\")   Wt 96.6 kg (213 lb)   SpO2 97%  Body mass index is 31.45 " kg/m².    Hearing good with hearing aids   Dentition good  Alert, oriented in no acute distress.  Eye contact is good, speech goal directed, affect calm    Assessment and Plan. The following treatment and monitoring plan is recommended:    1. Medicare annual wellness visit, initial    2. History of CVA (cerebrovascular accident)    3. Right carpal tunnel syndrome    4. Factor 5 Leiden mutation, heterozygous (HCC)    5. Chronic right shoulder pain    6. Essential hypertension    7. Numerous moles  - Referral to Dermatology    8. Pure hypercholesterolemia  - CBC WITH DIFFERENTIAL; Future  - Comp Metabolic Panel; Future  - Lipid Profile; Future  - T3 FREE; Future  - TSH+FREE T4    9. Benign prostatic hyperplasia without lower urinary tract symptoms  - PROSTATE SPECIFIC AG DIAGNOSTIC; Future    10. Need for hepatitis C screening test  - HEP C VIRUS ANTIBODY; Future    Plan    Completed    2. Patient has been stable with current management  We will make no changes for now    3.  States no one called him from the referral service for physical therapy so I gave him the number to call and reach out.    4.Patient has been stable with current management  We will make no changes for now    5.Patient has been stable with current management  We will make no changes for now    6.Patient has been stable with current management  We will make no changes for now    7.Patient has been stable with current management  We will make no changes for now    8.Patient has been stable with current management  We will make no changes for now    9.Patient has been stable with current management  We will make no changes for now    10.  We will obtain new labs to update clinical profile.  Then we will adjust therapy as needed.            Services suggested: No services needed at this time  Health Care Screening: Age-appropriate preventive services recommended by USPTF and ACIP covered by Medicare were discussed today. Services ordered if indicated  and agreed upon by the patient.  Referrals offered: Community-based lifestyle interventions to reduce health risks and promote self-management and wellness, fall prevention, nutrition, physical activity, tobacco-use cessation, weight loss, and mental health services as per orders if indicated.    Discussion today about general wellness and lifestyle habits:    Prevent falls and reduce trip hazards; Cautioned about securing or removing rugs.  Have a working fire alarm and carbon monoxide detector;   Engage in regular physical activity and social activities     Follow-up: Return in about 6 months (around 5/22/2024) for Reevaluation, labs.

## 2023-12-12 ENCOUNTER — APPOINTMENT (OUTPATIENT)
Dept: VASCULAR LAB | Facility: MEDICAL CENTER | Age: 63
End: 2023-12-12
Payer: MEDICARE

## 2023-12-21 DIAGNOSIS — I82.409 DEEP VEIN THROMBOSIS (DVT) OF LOWER EXTREMITY, UNSPECIFIED CHRONICITY, UNSPECIFIED LATERALITY, UNSPECIFIED VEIN (HCC): ICD-10-CM

## 2024-01-05 ENCOUNTER — TELEPHONE (OUTPATIENT)
Dept: HEALTH INFORMATION MANAGEMENT | Facility: OTHER | Age: 64
End: 2024-01-05
Payer: MEDICARE

## 2024-01-09 ENCOUNTER — APPOINTMENT (RX ONLY)
Dept: URBAN - METROPOLITAN AREA CLINIC 4 | Facility: CLINIC | Age: 64
Setting detail: DERMATOLOGY
End: 2024-01-09

## 2024-01-09 DIAGNOSIS — L81.4 OTHER MELANIN HYPERPIGMENTATION: ICD-10-CM

## 2024-01-09 DIAGNOSIS — Z71.89 OTHER SPECIFIED COUNSELING: ICD-10-CM

## 2024-01-09 DIAGNOSIS — L82.1 OTHER SEBORRHEIC KERATOSIS: ICD-10-CM

## 2024-01-09 DIAGNOSIS — D18.0 HEMANGIOMA: ICD-10-CM

## 2024-01-09 DIAGNOSIS — L57.0 ACTINIC KERATOSIS: ICD-10-CM

## 2024-01-09 DIAGNOSIS — D22 MELANOCYTIC NEVI: ICD-10-CM

## 2024-01-09 PROBLEM — D18.01 HEMANGIOMA OF SKIN AND SUBCUTANEOUS TISSUE: Status: ACTIVE | Noted: 2024-01-09

## 2024-01-09 PROBLEM — D22.5 MELANOCYTIC NEVI OF TRUNK: Status: ACTIVE | Noted: 2024-01-09

## 2024-01-09 PROCEDURE — ? SUNSCREEN RECOMMENDATIONS

## 2024-01-09 PROCEDURE — ? LIQUID NITROGEN

## 2024-01-09 PROCEDURE — 17000 DESTRUCT PREMALG LESION: CPT

## 2024-01-09 PROCEDURE — 17003 DESTRUCT PREMALG LES 2-14: CPT

## 2024-01-09 PROCEDURE — 99213 OFFICE O/P EST LOW 20 MIN: CPT | Mod: 25

## 2024-01-09 PROCEDURE — ? COUNSELING

## 2024-01-09 ASSESSMENT — LOCATION DETAILED DESCRIPTION DERM
LOCATION DETAILED: POSTERIOR MID-PARIETAL SCALP
LOCATION DETAILED: INFERIOR THORACIC SPINE
LOCATION DETAILED: SUPERIOR LUMBAR SPINE
LOCATION DETAILED: RIGHT POSTERIOR SHOULDER
LOCATION DETAILED: RIGHT INFERIOR CENTRAL MALAR CHEEK
LOCATION DETAILED: LEFT POSTERIOR SHOULDER
LOCATION DETAILED: EPIGASTRIC SKIN

## 2024-01-09 ASSESSMENT — LOCATION SIMPLE DESCRIPTION DERM
LOCATION SIMPLE: LOWER BACK
LOCATION SIMPLE: ABDOMEN
LOCATION SIMPLE: RIGHT SHOULDER
LOCATION SIMPLE: RIGHT CHEEK
LOCATION SIMPLE: LEFT SHOULDER
LOCATION SIMPLE: UPPER BACK
LOCATION SIMPLE: POSTERIOR SCALP

## 2024-01-09 ASSESSMENT — LOCATION ZONE DERM
LOCATION ZONE: TRUNK
LOCATION ZONE: ARM
LOCATION ZONE: SCALP
LOCATION ZONE: FACE

## 2024-01-09 NOTE — PROCEDURE: LIQUID NITROGEN
Render Post-Care Instructions In Note?: no
Number Of Freeze-Thaw Cycles: 2 freeze-thaw cycles
Detail Level: Simple
Show Applicator Variable?: Yes
Consent: The patient's consent was obtained including but not limited to risks of crusting, scabbing, blistering, scarring, darker or lighter pigmentary change, recurrence, incomplete removal and infection.
Aperture Size (Optional): C
Application Tool (Optional): Liquid Nitrogen Sprayer
Post-Care Instructions: I reviewed with the patient in detail post-care instructions. Patient is to wear sunprotection, and avoid picking at any of the treated lesions. Pt may apply Vaseline to crusted or scabbing areas.
Duration Of Freeze Thaw-Cycle (Seconds): 2

## 2024-01-19 DIAGNOSIS — I10 ESSENTIAL HYPERTENSION: ICD-10-CM

## 2024-01-19 DIAGNOSIS — Z86.73 HISTORY OF CVA (CEREBROVASCULAR ACCIDENT): ICD-10-CM

## 2024-01-19 DIAGNOSIS — D68.51 FACTOR 5 LEIDEN MUTATION, HETEROZYGOUS (HCC): ICD-10-CM

## 2024-01-19 DIAGNOSIS — I82.409 DEEP VEIN THROMBOSIS (DVT) OF LOWER EXTREMITY, UNSPECIFIED CHRONICITY, UNSPECIFIED LATERALITY, UNSPECIFIED VEIN (HCC): ICD-10-CM

## 2024-01-19 RX ORDER — AMLODIPINE BESYLATE 5 MG/1
5 TABLET ORAL DAILY
Qty: 90 TABLET | Refills: 2 | Status: SHIPPED | OUTPATIENT
Start: 2024-01-19

## 2024-01-19 RX ORDER — LOSARTAN POTASSIUM AND HYDROCHLOROTHIAZIDE 25; 100 MG/1; MG/1
TABLET ORAL
Qty: 90 TABLET | Refills: 2 | Status: SHIPPED | OUTPATIENT
Start: 2024-01-19

## 2024-01-19 NOTE — TELEPHONE ENCOUNTER
Received request via: Pharmacy    Was the patient seen in the last year in this department? Yes    Does the patient have an active prescription (recently filled or refills available) for medication(s) requested? No    Pharmacy Name: [facility]    Does the patient have half-way Plus and need 100 day supply (blood pressure, diabetes and cholesterol meds only)? Patient does not have SCP

## 2024-01-24 PROBLEM — M19.131 SCAPHOLUNATE ADVANCED COLLAPSE OF RIGHT WRIST: Status: ACTIVE | Noted: 2024-01-24

## 2024-02-13 DIAGNOSIS — Z86.73 HISTORY OF CVA (CEREBROVASCULAR ACCIDENT): ICD-10-CM

## 2024-02-13 DIAGNOSIS — I82.409 DEEP VEIN THROMBOSIS (DVT) OF LOWER EXTREMITY, UNSPECIFIED CHRONICITY, UNSPECIFIED LATERALITY, UNSPECIFIED VEIN (HCC): ICD-10-CM

## 2024-02-13 DIAGNOSIS — D68.51 FACTOR 5 LEIDEN MUTATION, HETEROZYGOUS (HCC): ICD-10-CM

## 2024-02-13 NOTE — TELEPHONE ENCOUNTER
Received request via: Patient    Was the patient seen in the last year in this department? Yes    Does the patient have an active prescription (recently filled or refills available) for medication(s) requested? No    Pharmacy Name: [facility]     Does the patient have assisted Plus and need 100 day supply (blood pressure, diabetes and cholesterol meds only)? Patient does not have SCP

## 2024-02-28 DIAGNOSIS — I82.409 DEEP VEIN THROMBOSIS (DVT) OF LOWER EXTREMITY, UNSPECIFIED CHRONICITY, UNSPECIFIED LATERALITY, UNSPECIFIED VEIN (HCC): ICD-10-CM

## 2024-02-28 DIAGNOSIS — Z86.73 HISTORY OF CVA (CEREBROVASCULAR ACCIDENT): ICD-10-CM

## 2024-02-28 DIAGNOSIS — D68.51 FACTOR 5 LEIDEN MUTATION, HETEROZYGOUS (HCC): ICD-10-CM

## 2024-03-05 ENCOUNTER — ANTICOAGULATION VISIT (OUTPATIENT)
Dept: VASCULAR LAB | Facility: MEDICAL CENTER | Age: 64
End: 2024-03-05
Attending: INTERNAL MEDICINE
Payer: MEDICARE

## 2024-03-05 VITALS — HEART RATE: 61 BPM | DIASTOLIC BLOOD PRESSURE: 70 MMHG | SYSTOLIC BLOOD PRESSURE: 113 MMHG

## 2024-03-05 DIAGNOSIS — I82.409 DEEP VEIN THROMBOSIS (DVT) OF LOWER EXTREMITY, UNSPECIFIED CHRONICITY, UNSPECIFIED LATERALITY, UNSPECIFIED VEIN (HCC): ICD-10-CM

## 2024-03-05 DIAGNOSIS — Z86.73 HISTORY OF CVA (CEREBROVASCULAR ACCIDENT): ICD-10-CM

## 2024-03-05 DIAGNOSIS — D68.51 FACTOR 5 LEIDEN MUTATION, HETEROZYGOUS (HCC): ICD-10-CM

## 2024-03-05 PROCEDURE — 99212 OFFICE O/P EST SF 10 MIN: CPT

## 2024-03-05 NOTE — PROGRESS NOTES
Target end date: Indefinite  Indication: Recurrent VTE, hx of CVA and Factor V Leiden  Drug: Eliquis 5 mg BID  CHADsVASC = n/a  HAS-BLED = 1 (hx of CVA)    Health Status Since Last Assessment  Pt denies any new relevant medical problems, ED visits or hospitalizations  Pt denies any embolic events (stroke/tia/systemic embolism)    Adherence with DOAC Therapy  Pt has not missed doses in the average week.    Bleeding Risk Assessment  Pt denies epistaxis  Pt denies any hematuria  Pt denies any excessive or unusual bleeding/hematomas.  Pt denies any GI bleeds or hematemesis.  Pt denies any concerning daily headache or subdural hematoma symptoms.    Latest Hemoglobin: WNL but no recent labs since last visit  Hemoglobin   Date Value Ref Range Status   06/20/2023 16.1 14.0 - 18.0 g/dL Final     Latest Platelets: WNL but no recent labs since last visit  Platelet Count   Date Value Ref Range Status   06/20/2023 233 164 - 446 K/uL Final     Pt denies  ETOH overuse     Creatinine Clearance/Renal Function  Latest CrCl: >100 ml/min    Hepatic function  Latest LFTs: WNL but no recent labs since last visit  Pt denies any history of liver dysfunction      Drug Interactions  ASA/other antiplatelets: None  NSAID: None  Other drug interactions: None  Verified no anticonvulsant or azole therapy, education provided for future use.     Examination  Blood Pressure WNL  Vitals:    03/05/24 0759   BP: 113/70   Pulse: 61     Symptomatic hypotension: Denies   Significant gait impairment/imbalance/high risk for falls? No issues    Final Assessment and Recommendations:  Patient appears stable from the anticoagulation standpoint.    Benefits of continued DOAC therapy outweigh risks for this patient  Recommend pt continue with current DOAC therapy: Eliquis 5 mg BID  DOAC is affordable     Other Actions: CMP/CBC hemogram ordered prior to next visit    Follow up:  Will follow up with patient 6 month(s).     Fuentes Bo, SatnamD, BCACP

## 2024-09-03 ENCOUNTER — APPOINTMENT (OUTPATIENT)
Dept: VASCULAR LAB | Facility: MEDICAL CENTER | Age: 64
End: 2024-09-03
Attending: INTERNAL MEDICINE
Payer: MEDICARE

## 2024-10-22 DIAGNOSIS — D68.51 FACTOR 5 LEIDEN MUTATION, HETEROZYGOUS (HCC): ICD-10-CM

## 2024-10-22 DIAGNOSIS — I82.409 DEEP VEIN THROMBOSIS (DVT) OF LOWER EXTREMITY, UNSPECIFIED CHRONICITY, UNSPECIFIED LATERALITY, UNSPECIFIED VEIN (HCC): ICD-10-CM

## 2024-10-22 DIAGNOSIS — Z86.73 HISTORY OF CVA (CEREBROVASCULAR ACCIDENT): ICD-10-CM

## 2024-10-23 DIAGNOSIS — I10 ESSENTIAL HYPERTENSION: ICD-10-CM

## 2024-10-23 RX ORDER — APIXABAN 5 MG/1
TABLET, FILM COATED ORAL
Qty: 60 TABLET | Refills: 0 | Status: SHIPPED | OUTPATIENT
Start: 2024-10-23

## 2024-10-24 RX ORDER — LOSARTAN POTASSIUM AND HYDROCHLOROTHIAZIDE 25; 100 MG/1; MG/1
TABLET ORAL
Qty: 90 TABLET | Refills: 0 | Status: SHIPPED | OUTPATIENT
Start: 2024-10-24

## 2024-10-24 RX ORDER — AMLODIPINE BESYLATE 5 MG/1
5 TABLET ORAL DAILY
Qty: 90 TABLET | Refills: 0 | Status: SHIPPED | OUTPATIENT
Start: 2024-10-24

## 2024-11-14 DIAGNOSIS — Z79.01 CHRONIC ANTICOAGULATION: ICD-10-CM

## 2024-11-19 ENCOUNTER — TELEPHONE (OUTPATIENT)
Dept: VASCULAR LAB | Facility: MEDICAL CENTER | Age: 64
End: 2024-11-19
Payer: MEDICARE

## 2024-11-19 DIAGNOSIS — I82.409 DEEP VEIN THROMBOSIS (DVT) OF LOWER EXTREMITY, UNSPECIFIED CHRONICITY, UNSPECIFIED LATERALITY, UNSPECIFIED VEIN (HCC): ICD-10-CM

## 2024-11-19 DIAGNOSIS — D68.51 FACTOR 5 LEIDEN MUTATION, HETEROZYGOUS (HCC): ICD-10-CM

## 2024-11-19 DIAGNOSIS — Z86.73 HISTORY OF CVA (CEREBROVASCULAR ACCIDENT): ICD-10-CM

## 2024-11-19 NOTE — TELEPHONE ENCOUNTER
15-day courtesy refill sent to preferred pharmacy. Patient needs to be seen for additional refills. He has an appt on 11/22/24.    Fuentes Bo, SatnamD, BCACP

## 2024-11-19 NOTE — TELEPHONE ENCOUNTER
Received request via: Patient    Was the patient seen in the last year in this department? Yes    Does the patient have an active prescription (recently filled or refills available) for medication(s) requested? Yes. Refill request has been refused in [facility]. Contacted pharmacy and called in most recent prescription.    Pharmacy Name: [facility], [place] - [address] [[id number]     Does the patient have [facility] Plus and need 100-day supply? (This applies to ALL medications) Patient does not have SCP      Thank you    -Raj JORGE

## 2024-11-22 ENCOUNTER — ANTICOAGULATION VISIT (OUTPATIENT)
Dept: VASCULAR LAB | Facility: MEDICAL CENTER | Age: 64
End: 2024-11-22
Attending: INTERNAL MEDICINE
Payer: MEDICARE

## 2024-11-22 VITALS — DIASTOLIC BLOOD PRESSURE: 70 MMHG | SYSTOLIC BLOOD PRESSURE: 110 MMHG | HEART RATE: 64 BPM

## 2024-11-22 DIAGNOSIS — Z86.73 HISTORY OF CVA (CEREBROVASCULAR ACCIDENT): ICD-10-CM

## 2024-11-22 DIAGNOSIS — D68.51 FACTOR 5 LEIDEN MUTATION, HETEROZYGOUS (HCC): ICD-10-CM

## 2024-11-22 DIAGNOSIS — Z79.01 CHRONIC ANTICOAGULATION: ICD-10-CM

## 2024-11-22 PROCEDURE — 99212 OFFICE O/P EST SF 10 MIN: CPT

## 2024-11-22 NOTE — PROGRESS NOTES
Target end date: Indefinite  Indication: Recurrent VTE, hx of CVA and Factor V Leiden  Drug: Eliquis 5 mg BID  CHADsVASC = N/A  HAS-BLED = 2 (age, hx of CVA)    Health Status Since Last Assessment  Pt denies any new relevant medical problems, ED visits or hospitalizations  Pt denies any embolic events (stroke/tia/systemic embolism)    Adherence with DOAC Therapy  Pt has not missed doses in the average week.  DOAC is affordable    Bleeding Risk Assessment  Pt denies epistaxis  Pt denies any hematuria  Pt denies any excessive or unusual bleeding/hematomas.  Pt denies any GI bleeds or hematemesis.  Pt denies any concerning daily headache or subdural hematoma symptoms.    Latest Hemoglobin: WNL  Hemoglobin   Date Value Ref Range Status   06/20/2023 16.1 14.0 - 18.0 g/dL Final     Latest Platelets: WNL  Platelet Count   Date Value Ref Range Status   06/20/2023 233 164 - 446 K/uL Final       Creatinine Clearance/Renal Function  Latest CrCl: >100 ml/min    Hepatic function  Latest LFTs: WNL  Pt denies any history of liver dysfunction   Pt reports  ETOH overuse - drinks 1 glass of wine/night. Educated regarding s/sx of GI Bleed, recommended avoiding/limiting EtOH consumption as much as able. Pt aware of risks.       Drug Interactions  ASA/other antiplatelets: None  NSAID: None  Other drug interactions: None  Verified no anticonvulsant or azole therapy, education provided for future use.     Examination  Blood Pressure WNL  Vitals:    11/22/24 0847   BP: 110/70   Pulse: 64     Symptomatic hypotension: Reports occasional lightheadedness; however, he attributes this to current life stressors and states that it has been less frequent as of late.  Significant gait impairment/imbalance/high risk for falls? No    Final Assessment and Recommendations:  Patient appears stable from the anticoagulation standpoint.    Benefits of continued DOAC therapy outweigh risks for this patient  Recommend pt continue with current DOAC therapy:  Eliquis 5 mg BID       Other Actions: CMP/CBC hemogram ordered prior to next visit    Follow up:  Will follow up in 3 weeks to check labs, then in person with patient in 6 month(s).     Nawaf Gary, PharmD    CC:   Rx Coordinators to release Eliquis prescription as pt has short supply and will be traveling soon.

## 2024-11-22 NOTE — Clinical Note
Good morning, this patient is running low and plans to be traveling soon. Can we release the prescription as soon as able. Thank you!  Nawaf

## 2024-12-07 LAB
ALBUMIN SERPL-MCNC: 4.5 G/DL (ref 3.9–4.9)
ALP SERPL-CCNC: 79 IU/L (ref 44–121)
ALT SERPL-CCNC: 20 IU/L (ref 0–44)
AST SERPL-CCNC: 16 IU/L (ref 0–40)
BILIRUB SERPL-MCNC: 1.3 MG/DL (ref 0–1.2)
BUN SERPL-MCNC: 15 MG/DL (ref 8–27)
BUN/CREAT SERPL: 14 (ref 10–24)
CALCIUM SERPL-MCNC: 9.3 MG/DL (ref 8.6–10.2)
CHLORIDE SERPL-SCNC: 101 MMOL/L (ref 96–106)
CO2 SERPL-SCNC: 26 MMOL/L (ref 20–29)
CREAT SERPL-MCNC: 1.04 MG/DL (ref 0.76–1.27)
EGFRCR SERPLBLD CKD-EPI 2021: 79 ML/MIN/1.73
ERYTHROCYTE [DISTWIDTH] IN BLOOD BY AUTOMATED COUNT: 12.2 % (ref 11.6–15.4)
GLOBULIN SER CALC-MCNC: 2.4 G/DL (ref 1.5–4.5)
GLUCOSE SERPL-MCNC: 98 MG/DL (ref 70–99)
HCT VFR BLD AUTO: 48.2 % (ref 37.5–51)
HGB BLD-MCNC: 16.6 G/DL (ref 13–17.7)
MCH RBC QN AUTO: 31.1 PG (ref 26.6–33)
MCHC RBC AUTO-ENTMCNC: 34.4 G/DL (ref 31.5–35.7)
MCV RBC AUTO: 90 FL (ref 79–97)
NRBC BLD AUTO-RTO: NORMAL %
PLATELET # BLD AUTO: 239 X10E3/UL (ref 150–450)
POTASSIUM SERPL-SCNC: 3.7 MMOL/L (ref 3.5–5.2)
PROT SERPL-MCNC: 6.9 G/DL (ref 6–8.5)
RBC # BLD AUTO: 5.33 X10E6/UL (ref 4.14–5.8)
SODIUM SERPL-SCNC: 141 MMOL/L (ref 134–144)
WBC # BLD AUTO: 7.4 X10E3/UL (ref 3.4–10.8)

## 2024-12-23 DIAGNOSIS — I82.409 DEEP VEIN THROMBOSIS (DVT) OF LOWER EXTREMITY, UNSPECIFIED CHRONICITY, UNSPECIFIED LATERALITY, UNSPECIFIED VEIN (HCC): ICD-10-CM

## 2025-02-06 LAB
25(OH)D3+25(OH)D2 SERPL-MCNC: 27.6 NG/ML (ref 30–100)
ALBUMIN SERPL-MCNC: 4.3 G/DL (ref 3.9–4.9)
ALP SERPL-CCNC: 75 IU/L (ref 44–121)
ALT SERPL-CCNC: 17 IU/L (ref 0–44)
AST SERPL-CCNC: 17 IU/L (ref 0–40)
BASOPHILS # BLD AUTO: 0 X10E3/UL (ref 0–0.2)
BASOPHILS NFR BLD AUTO: 1 %
BILIRUB SERPL-MCNC: 1.1 MG/DL (ref 0–1.2)
BUN SERPL-MCNC: 22 MG/DL (ref 8–27)
BUN/CREAT SERPL: 20 (ref 10–24)
CALCIUM SERPL-MCNC: 9.4 MG/DL (ref 8.6–10.2)
CHLORIDE SERPL-SCNC: 104 MMOL/L (ref 96–106)
CHOLEST SERPL-MCNC: 180 MG/DL (ref 100–199)
CO2 SERPL-SCNC: 26 MMOL/L (ref 20–29)
CREAT SERPL-MCNC: 1.08 MG/DL (ref 0.76–1.27)
EGFRCR SERPLBLD CKD-EPI 2021: 76 ML/MIN/1.73
EOSINOPHIL # BLD AUTO: 0.2 X10E3/UL (ref 0–0.4)
EOSINOPHIL NFR BLD AUTO: 2 %
ERYTHROCYTE [DISTWIDTH] IN BLOOD BY AUTOMATED COUNT: 12.6 % (ref 11.6–15.4)
GLOBULIN SER CALC-MCNC: 2.3 G/DL (ref 1.5–4.5)
GLUCOSE SERPL-MCNC: 104 MG/DL (ref 70–99)
HCT VFR BLD AUTO: 48.3 % (ref 37.5–51)
HDLC SERPL-MCNC: 47 MG/DL
HGB BLD-MCNC: 16.4 G/DL (ref 13–17.7)
IMM GRANULOCYTES # BLD AUTO: 0 X10E3/UL (ref 0–0.1)
IMM GRANULOCYTES NFR BLD AUTO: 0 %
IMMATURE CELLS  115398: NORMAL
LDL CALC COMMENT:: ABNORMAL
LDLC SERPL CALC-MCNC: 115 MG/DL (ref 0–99)
LYMPHOCYTES # BLD AUTO: 2.1 X10E3/UL (ref 0.7–3.1)
LYMPHOCYTES NFR BLD AUTO: 32 %
MCH RBC QN AUTO: 31.2 PG (ref 26.6–33)
MCHC RBC AUTO-ENTMCNC: 34 G/DL (ref 31.5–35.7)
MCV RBC AUTO: 92 FL (ref 79–97)
MONOCYTES # BLD AUTO: 0.5 X10E3/UL (ref 0.1–0.9)
MONOCYTES NFR BLD AUTO: 7 %
MORPHOLOGY BLD-IMP: NORMAL
NEUTROPHILS # BLD AUTO: 3.7 X10E3/UL (ref 1.4–7)
NEUTROPHILS NFR BLD AUTO: 58 %
NRBC BLD AUTO-RTO: NORMAL %
PLATELET # BLD AUTO: 234 X10E3/UL (ref 150–450)
POTASSIUM SERPL-SCNC: 4 MMOL/L (ref 3.5–5.2)
PROT SERPL-MCNC: 6.6 G/DL (ref 6–8.5)
PSA SERPL-MCNC: 1.4 NG/ML (ref 0–4)
RBC # BLD AUTO: 5.25 X10E6/UL (ref 4.14–5.8)
SODIUM SERPL-SCNC: 142 MMOL/L (ref 134–144)
T3FREE SERPL-MCNC: 3.2 PG/ML (ref 2–4.4)
T4 FREE SERPL-MCNC: 1.36 NG/DL (ref 0.82–1.77)
TRIGL SERPL-MCNC: 96 MG/DL (ref 0–149)
TSH SERPL DL<=0.005 MIU/L-ACNC: 1.06 UIU/ML (ref 0.45–4.5)
VLDLC SERPL CALC-MCNC: 18 MG/DL (ref 5–40)
WBC # BLD AUTO: 6.4 X10E3/UL (ref 3.4–10.8)